# Patient Record
Sex: FEMALE | Race: WHITE | NOT HISPANIC OR LATINO | Employment: UNEMPLOYED | URBAN - METROPOLITAN AREA
[De-identification: names, ages, dates, MRNs, and addresses within clinical notes are randomized per-mention and may not be internally consistent; named-entity substitution may affect disease eponyms.]

---

## 2022-10-29 ENCOUNTER — HOSPITAL ENCOUNTER (INPATIENT)
Facility: MEDICAL CENTER | Age: 82
LOS: 4 days | DRG: 956 | End: 2022-11-02
Attending: INTERNAL MEDICINE | Admitting: STUDENT IN AN ORGANIZED HEALTH CARE EDUCATION/TRAINING PROGRAM
Payer: MEDICARE

## 2022-10-29 DIAGNOSIS — S32.82XA MULTIPLE CLOSED FRACTURES OF PELVIS WITHOUT DISRUPTION OF PELVIC RING, INITIAL ENCOUNTER (HCC): ICD-10-CM

## 2022-10-29 DIAGNOSIS — S72.002A CLOSED LEFT HIP FRACTURE, INITIAL ENCOUNTER (HCC): ICD-10-CM

## 2022-10-29 PROBLEM — R79.89 ELEVATED BRAIN NATRIURETIC PEPTIDE (BNP) LEVEL: Status: ACTIVE | Noted: 2022-10-29

## 2022-10-29 PROBLEM — M25.552 LEFT HIP PAIN: Status: ACTIVE | Noted: 2022-10-29

## 2022-10-29 PROBLEM — R79.89 ELEVATED TROPONIN: Status: ACTIVE | Noted: 2022-10-29

## 2022-10-29 PROBLEM — J98.11 ATELECTASIS: Status: ACTIVE | Noted: 2022-10-29

## 2022-10-29 PROBLEM — R17 ELEVATED BILIRUBIN: Status: ACTIVE | Noted: 2022-10-29

## 2022-10-29 LAB
BASOPHILS # BLD AUTO: 0.4 % (ref 0–1.8)
BASOPHILS # BLD: 0.03 K/UL (ref 0–0.12)
EKG IMPRESSION: NORMAL
EOSINOPHIL # BLD AUTO: 0.01 K/UL (ref 0–0.51)
EOSINOPHIL NFR BLD: 0.1 % (ref 0–6.9)
ERYTHROCYTE [DISTWIDTH] IN BLOOD BY AUTOMATED COUNT: 55.9 FL (ref 35.9–50)
HCT VFR BLD AUTO: 28.5 % (ref 37–47)
HGB BLD-MCNC: 9.1 G/DL (ref 12–16)
IMM GRANULOCYTES # BLD AUTO: 0.07 K/UL (ref 0–0.11)
IMM GRANULOCYTES NFR BLD AUTO: 0.8 % (ref 0–0.9)
LYMPHOCYTES # BLD AUTO: 0.41 K/UL (ref 1–4.8)
LYMPHOCYTES NFR BLD: 4.9 % (ref 22–41)
MCH RBC QN AUTO: 31.2 PG (ref 27–33)
MCHC RBC AUTO-ENTMCNC: 31.9 G/DL (ref 33.6–35)
MCV RBC AUTO: 97.6 FL (ref 81.4–97.8)
MONOCYTES # BLD AUTO: 0.67 K/UL (ref 0–0.85)
MONOCYTES NFR BLD AUTO: 8.1 % (ref 0–13.4)
NEUTROPHILS # BLD AUTO: 7.11 K/UL (ref 2–7.15)
NEUTROPHILS NFR BLD: 85.7 % (ref 44–72)
NRBC # BLD AUTO: 0 K/UL
NRBC BLD-RTO: 0 /100 WBC
PLATELET # BLD AUTO: 159 K/UL (ref 164–446)
PMV BLD AUTO: 10.5 FL (ref 9–12.9)
RBC # BLD AUTO: 2.92 M/UL (ref 4.2–5.4)
WBC # BLD AUTO: 8.3 K/UL (ref 4.8–10.8)

## 2022-10-29 PROCEDURE — 99223 1ST HOSP IP/OBS HIGH 75: CPT | Mod: AI,GC | Performed by: STUDENT IN AN ORGANIZED HEALTH CARE EDUCATION/TRAINING PROGRAM

## 2022-10-29 PROCEDURE — 93010 ELECTROCARDIOGRAM REPORT: CPT | Performed by: INTERNAL MEDICINE

## 2022-10-29 PROCEDURE — 80053 COMPREHEN METABOLIC PANEL: CPT

## 2022-10-29 PROCEDURE — 84484 ASSAY OF TROPONIN QUANT: CPT

## 2022-10-29 PROCEDURE — 83036 HEMOGLOBIN GLYCOSYLATED A1C: CPT

## 2022-10-29 PROCEDURE — 82550 ASSAY OF CK (CPK): CPT

## 2022-10-29 PROCEDURE — 770020 HCHG ROOM/CARE - TELE (206)

## 2022-10-29 PROCEDURE — 82248 BILIRUBIN DIRECT: CPT

## 2022-10-29 PROCEDURE — 93005 ELECTROCARDIOGRAM TRACING: CPT | Performed by: STUDENT IN AN ORGANIZED HEALTH CARE EDUCATION/TRAINING PROGRAM

## 2022-10-29 PROCEDURE — 36415 COLL VENOUS BLD VENIPUNCTURE: CPT

## 2022-10-29 PROCEDURE — 85025 COMPLETE CBC W/AUTO DIFF WBC: CPT

## 2022-10-29 RX ORDER — BISACODYL 10 MG
10 SUPPOSITORY, RECTAL RECTAL
Status: DISCONTINUED | OUTPATIENT
Start: 2022-10-29 | End: 2022-10-29

## 2022-10-29 RX ORDER — AMOXICILLIN 250 MG
2 CAPSULE ORAL 2 TIMES DAILY
Status: DISCONTINUED | OUTPATIENT
Start: 2022-10-29 | End: 2022-10-29

## 2022-10-29 RX ORDER — POLYETHYLENE GLYCOL 3350 17 G/17G
1 POWDER, FOR SOLUTION ORAL
Status: DISCONTINUED | OUTPATIENT
Start: 2022-10-29 | End: 2022-10-29

## 2022-10-29 RX ORDER — ENOXAPARIN SODIUM 100 MG/ML
40 INJECTION SUBCUTANEOUS DAILY
Status: DISCONTINUED | OUTPATIENT
Start: 2022-10-29 | End: 2022-10-29

## 2022-10-29 RX ORDER — ACETAMINOPHEN 325 MG/1
650 TABLET ORAL EVERY 6 HOURS PRN
Status: DISCONTINUED | OUTPATIENT
Start: 2022-10-29 | End: 2022-11-02 | Stop reason: HOSPADM

## 2022-10-29 ASSESSMENT — COGNITIVE AND FUNCTIONAL STATUS - GENERAL
MOVING FROM LYING ON BACK TO SITTING ON SIDE OF FLAT BED: A LITTLE
SUGGESTED CMS G CODE MODIFIER MOBILITY: CK
MOVING TO AND FROM BED TO CHAIR: A LITTLE
MOBILITY SCORE: 19
STANDING UP FROM CHAIR USING ARMS: A LITTLE
SUGGESTED CMS G CODE MODIFIER DAILY ACTIVITY: CH
DAILY ACTIVITIY SCORE: 24
WALKING IN HOSPITAL ROOM: A LITTLE
CLIMB 3 TO 5 STEPS WITH RAILING: A LITTLE

## 2022-10-29 ASSESSMENT — PATIENT HEALTH QUESTIONNAIRE - PHQ9
2. FEELING DOWN, DEPRESSED, IRRITABLE, OR HOPELESS: NOT AT ALL
SUM OF ALL RESPONSES TO PHQ9 QUESTIONS 1 AND 2: 0
1. LITTLE INTEREST OR PLEASURE IN DOING THINGS: NOT AT ALL

## 2022-10-29 ASSESSMENT — LIFESTYLE VARIABLES
HAVE PEOPLE ANNOYED YOU BY CRITICIZING YOUR DRINKING: NO
EVER HAD A DRINK FIRST THING IN THE MORNING TO STEADY YOUR NERVES TO GET RID OF A HANGOVER: NO
TOTAL SCORE: 0
CONSUMPTION TOTAL: NEGATIVE
HOW MANY TIMES IN THE PAST YEAR HAVE YOU HAD 5 OR MORE DRINKS IN A DAY: 0
TOTAL SCORE: 0
HAVE YOU EVER FELT YOU SHOULD CUT DOWN ON YOUR DRINKING: NO
ON A TYPICAL DAY WHEN YOU DRINK ALCOHOL HOW MANY DRINKS DO YOU HAVE: 0
AVERAGE NUMBER OF DAYS PER WEEK YOU HAVE A DRINK CONTAINING ALCOHOL: 0
DOES PATIENT WANT TO STOP DRINKING: NO
ALCOHOL_USE: NO
TOTAL SCORE: 0
EVER FELT BAD OR GUILTY ABOUT YOUR DRINKING: NO

## 2022-10-29 ASSESSMENT — PAIN DESCRIPTION - PAIN TYPE: TYPE: ACUTE PAIN

## 2022-10-30 ENCOUNTER — APPOINTMENT (OUTPATIENT)
Dept: RADIOLOGY | Facility: MEDICAL CENTER | Age: 82
DRG: 956 | End: 2022-10-30
Attending: STUDENT IN AN ORGANIZED HEALTH CARE EDUCATION/TRAINING PROGRAM
Payer: MEDICARE

## 2022-10-30 ENCOUNTER — ANESTHESIA EVENT (OUTPATIENT)
Dept: SURGERY | Facility: MEDICAL CENTER | Age: 82
DRG: 956 | End: 2022-10-30
Payer: MEDICARE

## 2022-10-30 ENCOUNTER — APPOINTMENT (OUTPATIENT)
Dept: CARDIOLOGY | Facility: MEDICAL CENTER | Age: 82
DRG: 956 | End: 2022-10-30
Attending: STUDENT IN AN ORGANIZED HEALTH CARE EDUCATION/TRAINING PROGRAM
Payer: MEDICARE

## 2022-10-30 ENCOUNTER — HOSPITAL ENCOUNTER (OUTPATIENT)
Dept: RADIOLOGY | Facility: MEDICAL CENTER | Age: 82
End: 2022-10-30
Attending: STUDENT IN AN ORGANIZED HEALTH CARE EDUCATION/TRAINING PROGRAM

## 2022-10-30 ENCOUNTER — ANESTHESIA (OUTPATIENT)
Dept: SURGERY | Facility: MEDICAL CENTER | Age: 82
DRG: 956 | End: 2022-10-30
Payer: MEDICARE

## 2022-10-30 PROBLEM — S72.002A CLOSED LEFT HIP FRACTURE, INITIAL ENCOUNTER (HCC): Status: ACTIVE | Noted: 2022-10-29

## 2022-10-30 PROBLEM — R09.02 HYPOXIA: Status: ACTIVE | Noted: 2022-10-30

## 2022-10-30 LAB
ALBUMIN SERPL BCP-MCNC: 3.5 G/DL (ref 3.2–4.9)
ALBUMIN/GLOB SERPL: 1.3 G/DL
ALP SERPL-CCNC: 93 U/L (ref 30–99)
ALT SERPL-CCNC: 17 U/L (ref 2–50)
ANION GAP SERPL CALC-SCNC: 13 MMOL/L (ref 7–16)
AST SERPL-CCNC: 26 U/L (ref 12–45)
BASE EXCESS BLDA CALC-SCNC: -2 MMOL/L (ref -4–3)
BILIRUB CONJ SERPL-MCNC: 0.2 MG/DL (ref 0.1–0.5)
BILIRUB CONJ SERPL-MCNC: 0.3 MG/DL (ref 0.1–0.5)
BILIRUB INDIRECT SERPL-MCNC: 0.6 MG/DL (ref 0–1)
BILIRUB SERPL-MCNC: 0.8 MG/DL (ref 0.1–1.5)
BODY TEMPERATURE: 37.3 CENTIGRADE
BUN SERPL-MCNC: 19 MG/DL (ref 8–22)
CALCIUM SERPL-MCNC: 8.6 MG/DL (ref 8.5–10.5)
CHLORIDE SERPL-SCNC: 107 MMOL/L (ref 96–112)
CK SERPL-CCNC: 334 U/L (ref 0–154)
CO2 SERPL-SCNC: 24 MMOL/L (ref 20–33)
CREAT SERPL-MCNC: 0.67 MG/DL (ref 0.5–1.4)
EST. AVERAGE GLUCOSE BLD GHB EST-MCNC: 103 MG/DL
EST. AVERAGE GLUCOSE BLD GHB EST-MCNC: 117 MG/DL
GFR SERPLBLD CREATININE-BSD FMLA CKD-EPI: 87 ML/MIN/1.73 M 2
GLOBULIN SER CALC-MCNC: 2.8 G/DL (ref 1.9–3.5)
GLUCOSE BLD STRIP.AUTO-MCNC: 103 MG/DL (ref 65–99)
GLUCOSE BLD STRIP.AUTO-MCNC: 168 MG/DL (ref 65–99)
GLUCOSE BLD STRIP.AUTO-MCNC: 58 MG/DL (ref 65–99)
GLUCOSE SERPL-MCNC: 101 MG/DL (ref 65–99)
HBA1C MFR BLD: 5.2 % (ref 4–5.6)
HBA1C MFR BLD: 5.7 % (ref 4–5.6)
HCO3 BLDA-SCNC: 22 MMOL/L (ref 17–25)
LV EJECT FRACT  99904: 65
LV EJECT FRACT MOD 2C 99903: 64.66
LV EJECT FRACT MOD 4C 99902: 68.97
LV EJECT FRACT MOD BP 99901: 65.47
PCO2 BLDA: 33.6 MMHG (ref 26–37)
PH BLDA: 7.43 [PH] (ref 7.4–7.5)
PO2 BLDA: 80.1 MMHG (ref 64–87)
POTASSIUM SERPL-SCNC: 3.3 MMOL/L (ref 3.6–5.5)
PROT SERPL-MCNC: 6.3 G/DL (ref 6–8.2)
SAO2 % BLDA: 94.4 % (ref 93–99)
SODIUM SERPL-SCNC: 144 MMOL/L (ref 135–145)
TROPONIN T SERPL-MCNC: 160 NG/L (ref 6–19)
TROPONIN T SERPL-MCNC: 170 NG/L (ref 6–19)

## 2022-10-30 PROCEDURE — 700105 HCHG RX REV CODE 258: Performed by: ANESTHESIOLOGY

## 2022-10-30 PROCEDURE — 160002 HCHG RECOVERY MINUTES (STAT): Performed by: STUDENT IN AN ORGANIZED HEALTH CARE EDUCATION/TRAINING PROGRAM

## 2022-10-30 PROCEDURE — 160009 HCHG ANES TIME/MIN: Performed by: STUDENT IN AN ORGANIZED HEALTH CARE EDUCATION/TRAINING PROGRAM

## 2022-10-30 PROCEDURE — 72192 CT PELVIS W/O DYE: CPT

## 2022-10-30 PROCEDURE — 160035 HCHG PACU - 1ST 60 MINS PHASE I: Performed by: STUDENT IN AN ORGANIZED HEALTH CARE EDUCATION/TRAINING PROGRAM

## 2022-10-30 PROCEDURE — 82803 BLOOD GASES ANY COMBINATION: CPT

## 2022-10-30 PROCEDURE — 83036 HEMOGLOBIN GLYCOSYLATED A1C: CPT

## 2022-10-30 PROCEDURE — 770020 HCHG ROOM/CARE - TELE (206)

## 2022-10-30 PROCEDURE — 99222 1ST HOSP IP/OBS MODERATE 55: CPT | Mod: 57 | Performed by: STUDENT IN AN ORGANIZED HEALTH CARE EDUCATION/TRAINING PROGRAM

## 2022-10-30 PROCEDURE — 36415 COLL VENOUS BLD VENIPUNCTURE: CPT

## 2022-10-30 PROCEDURE — 160036 HCHG PACU - EA ADDL 30 MINS PHASE I: Performed by: STUDENT IN AN ORGANIZED HEALTH CARE EDUCATION/TRAINING PROGRAM

## 2022-10-30 PROCEDURE — 84484 ASSAY OF TROPONIN QUANT: CPT

## 2022-10-30 PROCEDURE — 73721 MRI JNT OF LWR EXTRE W/O DYE: CPT | Mod: RT

## 2022-10-30 PROCEDURE — 0QH634Z INSERTION OF INTERNAL FIXATION DEVICE INTO RIGHT UPPER FEMUR, PERCUTANEOUS APPROACH: ICD-10-PCS | Performed by: STUDENT IN AN ORGANIZED HEALTH CARE EDUCATION/TRAINING PROGRAM

## 2022-10-30 PROCEDURE — 700101 HCHG RX REV CODE 250: Performed by: ANESTHESIOLOGY

## 2022-10-30 PROCEDURE — 73502 X-RAY EXAM HIP UNI 2-3 VIEWS: CPT | Mod: RT

## 2022-10-30 PROCEDURE — 160041 HCHG SURGERY MINUTES - EA ADDL 1 MIN LEVEL 4: Performed by: STUDENT IN AN ORGANIZED HEALTH CARE EDUCATION/TRAINING PROGRAM

## 2022-10-30 PROCEDURE — 27235 TREAT THIGH FRACTURE: CPT | Mod: RT | Performed by: STUDENT IN AN ORGANIZED HEALTH CARE EDUCATION/TRAINING PROGRAM

## 2022-10-30 PROCEDURE — 93306 TTE W/DOPPLER COMPLETE: CPT

## 2022-10-30 PROCEDURE — 700105 HCHG RX REV CODE 258: Performed by: HOSPITALIST

## 2022-10-30 PROCEDURE — C1713 ANCHOR/SCREW BN/BN,TIS/BN: HCPCS | Performed by: STUDENT IN AN ORGANIZED HEALTH CARE EDUCATION/TRAINING PROGRAM

## 2022-10-30 PROCEDURE — 72190 X-RAY EXAM OF PELVIS: CPT

## 2022-10-30 PROCEDURE — 99232 SBSQ HOSP IP/OBS MODERATE 35: CPT | Performed by: HOSPITALIST

## 2022-10-30 PROCEDURE — 160048 HCHG OR STATISTICAL LEVEL 1-5: Performed by: STUDENT IN AN ORGANIZED HEALTH CARE EDUCATION/TRAINING PROGRAM

## 2022-10-30 PROCEDURE — 160029 HCHG SURGERY MINUTES - 1ST 30 MINS LEVEL 4: Performed by: STUDENT IN AN ORGANIZED HEALTH CARE EDUCATION/TRAINING PROGRAM

## 2022-10-30 PROCEDURE — 99100 ANES PT EXTEME AGE<1 YR&>70: CPT | Performed by: ANESTHESIOLOGY

## 2022-10-30 PROCEDURE — 93306 TTE W/DOPPLER COMPLETE: CPT | Mod: 26 | Performed by: INTERNAL MEDICINE

## 2022-10-30 PROCEDURE — 700111 HCHG RX REV CODE 636 W/ 250 OVERRIDE (IP): Performed by: ANESTHESIOLOGY

## 2022-10-30 PROCEDURE — 01220 ANES CLSD PX UPPER 2/3 FEMUR: CPT | Performed by: ANESTHESIOLOGY

## 2022-10-30 PROCEDURE — 82248 BILIRUBIN DIRECT: CPT

## 2022-10-30 PROCEDURE — 82962 GLUCOSE BLOOD TEST: CPT | Mod: 91

## 2022-10-30 DEVICE — SCREW CANNULATED FULLY THREADED 7.3MM X 80MM (3TX3=9): Type: IMPLANTABLE DEVICE | Site: HIP | Status: FUNCTIONAL

## 2022-10-30 DEVICE — SCREW CANNULATED FULLY THREADED 7.3MM X 70MM (3TX3=9): Type: IMPLANTABLE DEVICE | Site: HIP | Status: FUNCTIONAL

## 2022-10-30 DEVICE — SCREW CANNULATED FULLY THREADED 7.3MM X 75MM (3TX3=9): Type: IMPLANTABLE DEVICE | Site: HIP | Status: FUNCTIONAL

## 2022-10-30 RX ORDER — ONDANSETRON 2 MG/ML
INJECTION INTRAMUSCULAR; INTRAVENOUS PRN
Status: DISCONTINUED | OUTPATIENT
Start: 2022-10-30 | End: 2022-10-30 | Stop reason: SURG

## 2022-10-30 RX ORDER — HALOPERIDOL 5 MG/ML
1 INJECTION INTRAMUSCULAR
Status: DISCONTINUED | OUTPATIENT
Start: 2022-10-30 | End: 2022-11-02 | Stop reason: HOSPADM

## 2022-10-30 RX ORDER — OXYCODONE HCL 5 MG/5 ML
5 SOLUTION, ORAL ORAL
Status: DISCONTINUED | OUTPATIENT
Start: 2022-10-30 | End: 2022-11-02 | Stop reason: HOSPADM

## 2022-10-30 RX ORDER — SODIUM CHLORIDE 9 MG/ML
INJECTION, SOLUTION INTRAVENOUS CONTINUOUS
Status: DISCONTINUED | OUTPATIENT
Start: 2022-10-30 | End: 2022-11-02 | Stop reason: HOSPADM

## 2022-10-30 RX ORDER — SODIUM CHLORIDE, SODIUM LACTATE, POTASSIUM CHLORIDE, CALCIUM CHLORIDE 600; 310; 30; 20 MG/100ML; MG/100ML; MG/100ML; MG/100ML
INJECTION, SOLUTION INTRAVENOUS CONTINUOUS
Status: DISCONTINUED | OUTPATIENT
Start: 2022-10-30 | End: 2022-10-30

## 2022-10-30 RX ORDER — DEXTROSE MONOHYDRATE 25 G/50ML
25 INJECTION, SOLUTION INTRAVENOUS ONCE
Status: DISCONTINUED | OUTPATIENT
Start: 2022-10-30 | End: 2022-10-30 | Stop reason: HOSPADM

## 2022-10-30 RX ORDER — MIDAZOLAM HYDROCHLORIDE 1 MG/ML
1 INJECTION INTRAMUSCULAR; INTRAVENOUS
Status: DISCONTINUED | OUTPATIENT
Start: 2022-10-30 | End: 2022-11-02 | Stop reason: HOSPADM

## 2022-10-30 RX ORDER — OXYCODONE HCL 5 MG/5 ML
10 SOLUTION, ORAL ORAL
Status: DISCONTINUED | OUTPATIENT
Start: 2022-10-30 | End: 2022-11-02 | Stop reason: HOSPADM

## 2022-10-30 RX ORDER — ONDANSETRON 2 MG/ML
4 INJECTION INTRAMUSCULAR; INTRAVENOUS
Status: DISCONTINUED | OUTPATIENT
Start: 2022-10-30 | End: 2022-11-02 | Stop reason: HOSPADM

## 2022-10-30 RX ORDER — ENOXAPARIN SODIUM 100 MG/ML
30 INJECTION SUBCUTANEOUS DAILY
Status: DISCONTINUED | OUTPATIENT
Start: 2022-10-31 | End: 2022-11-02 | Stop reason: HOSPADM

## 2022-10-30 RX ORDER — LABETALOL HYDROCHLORIDE 5 MG/ML
5 INJECTION, SOLUTION INTRAVENOUS
Status: DISCONTINUED | OUTPATIENT
Start: 2022-10-30 | End: 2022-11-02 | Stop reason: HOSPADM

## 2022-10-30 RX ORDER — DIPHENHYDRAMINE HYDROCHLORIDE 50 MG/ML
12.5 INJECTION INTRAMUSCULAR; INTRAVENOUS
Status: DISCONTINUED | OUTPATIENT
Start: 2022-10-30 | End: 2022-11-02 | Stop reason: HOSPADM

## 2022-10-30 RX ORDER — LIDOCAINE HYDROCHLORIDE 20 MG/ML
INJECTION, SOLUTION EPIDURAL; INFILTRATION; INTRACAUDAL; PERINEURAL PRN
Status: DISCONTINUED | OUTPATIENT
Start: 2022-10-30 | End: 2022-10-30 | Stop reason: SURG

## 2022-10-30 RX ORDER — HYDROMORPHONE HYDROCHLORIDE 1 MG/ML
0.1 INJECTION, SOLUTION INTRAMUSCULAR; INTRAVENOUS; SUBCUTANEOUS
Status: DISCONTINUED | OUTPATIENT
Start: 2022-10-30 | End: 2022-11-02 | Stop reason: HOSPADM

## 2022-10-30 RX ORDER — DEXAMETHASONE SODIUM PHOSPHATE 4 MG/ML
INJECTION, SOLUTION INTRA-ARTICULAR; INTRALESIONAL; INTRAMUSCULAR; INTRAVENOUS; SOFT TISSUE PRN
Status: DISCONTINUED | OUTPATIENT
Start: 2022-10-30 | End: 2022-10-30 | Stop reason: SURG

## 2022-10-30 RX ORDER — HYDROMORPHONE HYDROCHLORIDE 1 MG/ML
0.2 INJECTION, SOLUTION INTRAMUSCULAR; INTRAVENOUS; SUBCUTANEOUS
Status: DISCONTINUED | OUTPATIENT
Start: 2022-10-30 | End: 2022-11-02 | Stop reason: HOSPADM

## 2022-10-30 RX ORDER — CEFAZOLIN SODIUM 1 G/3ML
INJECTION, POWDER, FOR SOLUTION INTRAMUSCULAR; INTRAVENOUS PRN
Status: DISCONTINUED | OUTPATIENT
Start: 2022-10-30 | End: 2022-10-30 | Stop reason: SURG

## 2022-10-30 RX ORDER — DEXTROSE MONOHYDRATE 25 G/50ML
25 INJECTION, SOLUTION INTRAVENOUS
Status: DISCONTINUED | OUTPATIENT
Start: 2022-10-30 | End: 2022-11-02 | Stop reason: HOSPADM

## 2022-10-30 RX ORDER — SODIUM CHLORIDE, SODIUM LACTATE, POTASSIUM CHLORIDE, CALCIUM CHLORIDE 600; 310; 30; 20 MG/100ML; MG/100ML; MG/100ML; MG/100ML
INJECTION, SOLUTION INTRAVENOUS
Status: DISCONTINUED | OUTPATIENT
Start: 2022-10-30 | End: 2022-10-30 | Stop reason: SURG

## 2022-10-30 RX ORDER — HYDROMORPHONE HYDROCHLORIDE 1 MG/ML
0.4 INJECTION, SOLUTION INTRAMUSCULAR; INTRAVENOUS; SUBCUTANEOUS
Status: DISCONTINUED | OUTPATIENT
Start: 2022-10-30 | End: 2022-11-02 | Stop reason: HOSPADM

## 2022-10-30 RX ADMIN — DEXAMETHASONE SODIUM PHOSPHATE 8 MG: 4 INJECTION, SOLUTION INTRA-ARTICULAR; INTRALESIONAL; INTRAMUSCULAR; INTRAVENOUS; SOFT TISSUE at 14:31

## 2022-10-30 RX ADMIN — PROPOFOL 50 MG: 10 INJECTION, EMULSION INTRAVENOUS at 14:31

## 2022-10-30 RX ADMIN — CEFAZOLIN 2 G: 330 INJECTION, POWDER, FOR SOLUTION INTRAMUSCULAR; INTRAVENOUS at 14:31

## 2022-10-30 RX ADMIN — SODIUM CHLORIDE, POTASSIUM CHLORIDE, SODIUM LACTATE AND CALCIUM CHLORIDE: 600; 310; 30; 20 INJECTION, SOLUTION INTRAVENOUS at 14:31

## 2022-10-30 RX ADMIN — FENTANYL CITRATE 25 MCG: 50 INJECTION, SOLUTION INTRAMUSCULAR; INTRAVENOUS at 15:08

## 2022-10-30 RX ADMIN — LIDOCAINE HYDROCHLORIDE 100 MG: 20 INJECTION, SOLUTION EPIDURAL; INFILTRATION; INTRACAUDAL at 14:31

## 2022-10-30 RX ADMIN — SODIUM CHLORIDE, POTASSIUM CHLORIDE, SODIUM LACTATE AND CALCIUM CHLORIDE: 600; 310; 30; 20 INJECTION, SOLUTION INTRAVENOUS at 17:53

## 2022-10-30 RX ADMIN — ONDANSETRON 4 MG: 2 INJECTION INTRAMUSCULAR; INTRAVENOUS at 14:31

## 2022-10-30 RX ADMIN — SODIUM CHLORIDE: 9 INJECTION, SOLUTION INTRAVENOUS at 10:15

## 2022-10-30 RX ADMIN — FENTANYL CITRATE 25 MCG: 50 INJECTION, SOLUTION INTRAMUSCULAR; INTRAVENOUS at 14:53

## 2022-10-30 RX ADMIN — SODIUM CHLORIDE: 9 INJECTION, SOLUTION INTRAVENOUS at 21:02

## 2022-10-30 ASSESSMENT — PAIN DESCRIPTION - PAIN TYPE
TYPE: ACUTE PAIN

## 2022-10-30 ASSESSMENT — ENCOUNTER SYMPTOMS
CONSTIPATION: 0
FEVER: 0
WHEEZING: 0
VOMITING: 0
DIARRHEA: 0
NAUSEA: 0
COUGH: 0
HEADACHES: 0
SHORTNESS OF BREATH: 0
CHILLS: 0

## 2022-10-30 ASSESSMENT — PAIN SCALES - GENERAL: PAIN_LEVEL: 0

## 2022-10-30 NOTE — PROGRESS NOTES
Informed by the monitor room that this patient had 9 beats of Vtach at 2329. This RN was in the room with the patient at this time and the patient was asymptomatic. Notified MD.

## 2022-10-30 NOTE — CARE PLAN
The patient is Stable - Low risk of patient condition declining or worsening    Shift Goals  Clinical Goals: cardac workup, monitor for CP pain and SOB  Patient Goals: comfort, rest  Family Goals: comfort, rest    Progress made toward(s) clinical / shift goals:      Problem: Knowledge Deficit - Standard  Goal: Patient and family/care givers will demonstrate understanding of plan of care, disease process/condition, diagnostic tests and medications  Outcome: Progressing  Note: Patient educated on reasoning for admission, tests including x-ray CT and MRI of her pelvis. Pt verbalizes understanding and all questions were answered.     Problem: Fall Risk  Goal: Patient will remain free from falls  Outcome: Progressing  Note: Fall precautions are in place. Pt educated to call for assistance and verbalizes understanding/calls appropriately.       Patient is not progressing towards the following goals:

## 2022-10-30 NOTE — ANESTHESIA PREPROCEDURE EVALUATION
Case: [id number] Date/Time: 10/30/22 1200    Procedure: FIXATION, HIP, USING CANNULATED SCREW (Right: Hip) - [facility]    Anesthesia type: General    Location: [facility] OR  / SURGERY [facility]    Surgeons: Raciel López M.D.      81yoF with COPD s/p GLF     Presented with Elevated troponins (170-->160) & elevated BNP & EKG with mild ST depression. On tele had run of [employer] last night as well.     10/30/22 ECHO: EF 65% normal wall motion, normal diastolic function, Mild TR, RVSP 40mmHg     Pt does not receive medical care regularly at home    Relevant Problems   No relevant active problems       Physical Exam    Airway   Mallampati: II       Cardiovascular - normal exam     Dental    Pulmonary - normal exam     Abdominal - normal exam     Neurological - normal exam                 Anesthesia Plan    ASA 3   ASA physical status 3 criteria: COPD    Plan - general       Airway plan will be LMA          Induction: intravenous    Postoperative Plan: Postoperative administration of opioids is intended.    Pertinent diagnostic labs and testing reviewed    Informed Consent:    Anesthetic plan and risks discussed with patient.

## 2022-10-30 NOTE — CONSULTS
"10/30/2022    81-year-old female ground-level fall with right hip pain.  Right nondisplaced femoral neck fracture seen on CT and MRI.  Is potentially chronic in nature however difficult to discern as she is having right hip pain.  She also has some minimal displaced pelvic ring fractures.    No past medical history on file.    No past surgical history on file.    Medications  No current facility-administered medications on file prior to encounter.     No current outpatient medications on file prior to encounter.       Allergies  Patient has no allergy information on record.    ROS  . All other systems were reviewed and found to be negative    No family history on file.    Social History     Tobacco Use    Smoking status: Former     Types: Cigarettes    Smokeless tobacco: Never   Vaping Use    Vaping Use: Never used       Physical Exam  Vitals  /78   Pulse 95   Temp 36.1 °C (97 °F) (Temporal)   Resp 18   Ht 1.549 m (5' 1\")   Wt 47.7 kg (105 lb 2.6 oz)   SpO2 95%   General: Well Developed, Well Nourished, Age appropriate appearance  HEENT: Normocephalic, atraumatic  Psych: Normal mood and affect  Neck: Supple, nontender, no masses  Lungs: Breathing unlabored, No audible wheezing  Heart: Regular heart rate and rhythm  Abdomen: Soft, NT, ND  Neuro: Sensation grossly intact to BUE and BLE, moving all four extremities  Skin: Intact, no open wounds  Vascular: Right foot warm well perfused, Capillary refill <2 seconds  MSK: Right hip: Pain with logroll.  Pain with passive pelvis compression laterally.      Radiographs:  EC-ECHOCARDIOGRAM COMPLETE W/O CONT         MR-HIP-W/O RIGHT   Final Result      1.  Right femoral neck fracture with minimal associated edema      2.  This could indicate that the fracture is subacute      3.  Edema within the right adductor musculature consistent with a strain      CT-PELVIS W/O   Final Result         1.  Left pubic body fracture.   2.  Left superior pubic ramus/acetabular " fracture.   3.  Incomplete fracture of the left inferior pubic ramus.   4.  Minimally displaced left sacral fracture.   5.  Minimally displaced right inferior pubic ramus fracture.   6.  Right superior pubic ramus/pubic body fracture.   7.  Questionable, nondisplaced right femoral neck fracture.   8.  Severe degenerative changes of the right hip joint.   9.  Acute-subacute appearing L4 compression fracture.   10.  Decreased osseous mineralization.   11.  Gas in the anterior left lower quadrant subcutaneous fat. Correlate for recent injection versus infection.   12.  Large rectal stool ball.      DX-PELVIS-TRAUMA SERIES  3-   Final Result      1.  Osteopenia.   2.  Possible right femoral neck fracture.   3.  Age-indeterminate right superior and inferior pubic rami fractures.   4.  Age-indeterminate left inferior ramus fracture.      DX-HIP-UNILATERAL-W/O PELVIS-2/3 VIEWS RIGHT    (Results Pending)   DX-PORTABLE FLUORO > 1 HOUR    (Results Pending)       Laboratory Values  Recent Labs     10/29/22  2300   WBC 8.3   RBC 2.92*   HEMOGLOBIN 9.1*   HEMATOCRIT 28.5*   MCV 97.6   MCH 31.2   MCHC 31.9*   RDW 55.9*   PLATELETCT 159*   MPV 10.5     Recent Labs     10/29/22  2300   SODIUM 144   POTASSIUM 3.3*   CHLORIDE 107   CO2 24   GLUCOSE 101*   BUN 19   CPKTOTAL 334*             Impression: 81-year-old female ground-level fall with right nondisplaced femoral neck fracture as well as minimally displaced pelvic ring fractures.    Plan:We discussed the diagnosis and findings with the patient at length.  We reviewed possible non operative and operative interventions and the risks and benefits of each of these.  she had a chance to ask questions and all of these were answered to her satisfaction. The patient chose to proceed with surgical intervention. Risks and benefits of surgery were discussed which include but are not limited to bleeding, infection, neurovascular damage, malunion, nonunion, instability, limb length  discrepancy, DVT, PE, MI, Stroke and death. They understand these risks and wish to proceed.    Plan for or for percutaneous fixation right femoral neck fracture.  We discussed this may or may not be acute based on imaging however she is having pain so we discussed recommendations affixed to allow ambulation.    Raciel López MD  Orthopedic Trauma Surgery

## 2022-10-30 NOTE — PROGRESS NOTES
Right femoral neck fracture on MRI  Plan for percutaneous screw fixation today  NPO  BRET RLE    Full consult to follow      Raciel López MD  Orthopedic Trauma Surgery

## 2022-10-30 NOTE — ANESTHESIA POSTPROCEDURE EVALUATION
Patient: Bassam Sheehan    Procedure Summary     Date: 10/30/22 Room / Location: [place] [address] / SURGERY [facility]    Anesthesia Start: 1426 Anesthesia Stop: 1528    Procedure: FIXATION, HIP, USING CANNULATED SCREW (Right: Hip) Diagnosis: (RIGHT FEMORAL NECK FRACTURE)    Surgeons: Raciel López M.D. Responsible Provider: Teodoro Ellsworth M.D.    Anesthesia Type: general ASA Status: 3          Final Anesthesia Type: general  Last vitals  BP   Blood Pressure : (!) 148/68    Temp   36.7 °C (98 °F)    Pulse   76   Resp   16    SpO2   100 %      Anesthesia Post Evaluation    Patient location during evaluation: PACU  Patient participation: complete - patient participated  Level of consciousness: awake and alert  Pain score: 0    Airway patency: patent  Anesthetic complications: no  Cardiovascular status: adequate and hemodynamically stable  Respiratory status: acceptable  Hydration status: acceptable    PONV: none          No notable events documented.     Nurse Pain Score: 0 (NPRS)

## 2022-10-30 NOTE — OR NURSING
Patient sleepy.  Report called to Josselyn RAO on Tele states patient has not eatten for a long period.  Dr López updated.  FSBS 58.  Hospitalist paged to update per Dr López

## 2022-10-30 NOTE — ASSESSMENT & PLAN NOTE
(CT shows a possible R femoral neck fx even though her pain is on the L side. CT also showed several pubic rami fx/sacral fx)  -MRI = pending per Ortho recs (plan to formally consult again in the AM)

## 2022-10-30 NOTE — PROGRESS NOTES
Report received via telephone from outside facility. Patient brought up to room 735 via transport and assumed patient care. Pt A&O x 3, disorientated to situation, and on 2L NC, saturating 94 %. No signs of distress noted at this time. Notified admitting of patients arrival and paged the on-call direct admit hospitalist. Pt placed on tele box and monitors were notified of arrival. Pt updated on plan of care for the day and has call light within reach. Fall precautions are in place.

## 2022-10-30 NOTE — OR NURSING
Patient very sleepy. VSS. Resp spont and reg Denies pain or nausea.  Right hip dressing clean and dry.  Daughter Payton updated with patient status and plan.

## 2022-10-30 NOTE — THERAPY
Missed Therapy     Patient Name: Bassam Sheehan  Age:  81 y.o., Sex:  female  Medical Record #: [id number]  Today's Date: 10/30/2022    PT consult received. Per chart, plan for perc screw fixation RLE today. PT will evaluate post-op as able/appropriate.

## 2022-10-30 NOTE — ANESTHESIA PROCEDURE NOTES
Airway    Date/Time: 10/30/2022 2:44 PM  Performed by: Teodoro Ellsworth M.D.  Authorized by: Teodoro Ellsworth M.D.     Location:  OR  Urgency:  Elective  Indications for Airway Management:  Anesthesia      Spontaneous Ventilation: absent    Sedation Level:  Deep  Patient Position:  Sniffing  Mask Difficulty Assessment:  1 - vent by mask  Final Airway Type:  Supraglottic airway  Final Supraglottic Airway:  Standard LMA    SGA Size:  3  Number of Attempts at Approach:  1

## 2022-10-30 NOTE — ANESTHESIA TIME REPORT
Anesthesia Start and Stop Event Times     Date Time Event    10/30/2022 1420 Ready for Procedure     1426 Anesthesia Start     1528 Anesthesia Stop        Responsible Staff  10/30/22    Name Role Begin End    Teodoro Ellsworth M.D. Anesth 1426 1528        Overtime Reason:  no overtime (within assigned shift)    Comments:

## 2022-10-30 NOTE — PROGRESS NOTES
Right femoral neck fx on MRI  Plan for fixation today      Raciel López MD  Orthopedic Trauma Surgery

## 2022-10-30 NOTE — PROGRESS NOTES
4 Eyes Skin Assessment Completed by JALEN Card and CLAUS Chase.    Head WDL  Ears WDL  Nose WDL  Mouth WDL  Neck WDL  Breast/Chest WDL  Shoulder Blades WDL  Spine WDL  (R) Arm/Elbow/Hand Bruising  (L) Arm/Elbow/Hand Bruising  Abdomen WDL  Groin WDL  Scrotum/Coccyx/Buttocks WDL  (R) Leg Bruising, bruise to R shin from fall  (L) Leg Bruising, big bruise to L hip from fall  (R) Heel/Foot/Toe WDL  (L) Heel/Foot/Toe WDL          Devices In Places Tele Box, Blood Pressure Cuff, Pulse Ox, and Nasal Cannula      Interventions In Place Gray Ear Foams, Pillows, and Pressure Redistribution Mattress    Possible Skin Injury No    Pictures Uploaded Into Epic Yes  Wound Consult Placed N/A  RN Wound Prevention Protocol Ordered No

## 2022-10-30 NOTE — ASSESSMENT & PLAN NOTE
Trended down  Likely due to demand ischemia related to fall and fracture  Echo shows EF 65% with no wall motion abnormalities.

## 2022-10-30 NOTE — ASSESSMENT & PLAN NOTE
(Possibly secondary to muscle damage. etiology uncertain, but ACS is unlikely based on negative EKG and lack of CP. Pt does not have an elevated Cr that would suggest decreased clearance. CTA r/o PE).   -Trop T = 170

## 2022-10-30 NOTE — PROGRESS NOTES
Hospital Medicine Daily Progress Note    Date of Service  10/30/2022    Chief Complaint  Bassam Sheehan is a 81 y.o. female admitted 10/29/2022 with right femoral neck fracture    Hospital Course  No notes on file    Interval Problem Update  10/30: Taken by Ortho for femoral screw.  Echo unremarkable.  Feeling thirsty.  Started IV fluids.    I have discussed this patient's plan of care and discharge plan at IDT rounds today with Case Management, Nursing, Nursing leadership, and other members of the IDT team.    Code Status  Full Code    Disposition  Patient is not medically cleared for discharge.   Anticipate discharge to tbd.  I have placed the appropriate orders for post-discharge needs.    Review of Systems  Review of Systems   Constitutional:  Negative for chills and fever.        Thirsty   Respiratory:  Negative for cough, shortness of breath and wheezing.    Cardiovascular:  Negative for chest pain.   Gastrointestinal:  Negative for constipation, diarrhea, nausea and vomiting.   Genitourinary:  Negative for dysuria.   Musculoskeletal:  Positive for joint pain.   Neurological:  Negative for headaches.      Physical Exam  Temp:  [35.7 °C (96.3 °F)-37.7 °C (99.9 °F)] 35.7 °C (96.3 °F)  Pulse:  [89-97] 89  Resp:  [16-18] 16  BP: (125-184)/(73-80) 184/80  SpO2:  [91 %-95 %] 95 %    Physical Exam  Constitutional:       Appearance: She is well-developed.   HENT:      Head: Normocephalic.   Cardiovascular:      Rate and Rhythm: Normal rate.      Heart sounds:     No gallop.   Pulmonary:      Effort: No respiratory distress.      Breath sounds: No wheezing.   Abdominal:      General: There is no distension.      Tenderness: There is no abdominal tenderness.   Musculoskeletal:         General: Tenderness present.   Skin:     General: Skin is warm.   Neurological:      Mental Status: She is alert. Mental status is at baseline.       Fluids  No intake or output data in the 24 hours ending 10/30/22 1515    Laboratory  Recent  Labs     10/29/22  2300   WBC 8.3   RBC 2.92*   HEMOGLOBIN 9.1*   HEMATOCRIT 28.5*   MCV 97.6   MCH 31.2   MCHC 31.9*   RDW 55.9*   PLATELETCT 159*   MPV 10.5     Recent Labs     10/29/22  2300   SODIUM 144   POTASSIUM 3.3*   CHLORIDE 107   CO2 24   GLUCOSE 101*   BUN 19   CREATININE 0.67   CALCIUM 8.6                   Imaging  EC-ECHOCARDIOGRAM COMPLETE W/O CONT   Final Result      MR-HIP-W/O RIGHT   Final Result      1.  Right femoral neck fracture with minimal associated edema      2.  This could indicate that the fracture is subacute      3.  Edema within the right adductor musculature consistent with a strain      CT-PELVIS W/O   Final Result         1.  Left pubic body fracture.   2.  Left superior pubic ramus/acetabular fracture.   3.  Incomplete fracture of the left inferior pubic ramus.   4.  Minimally displaced left sacral fracture.   5.  Minimally displaced right inferior pubic ramus fracture.   6.  Right superior pubic ramus/pubic body fracture.   7.  Questionable, nondisplaced right femoral neck fracture.   8.  Severe degenerative changes of the right hip joint.   9.  Acute-subacute appearing L4 compression fracture.   10.  Decreased osseous mineralization.   11.  Gas in the anterior left lower quadrant subcutaneous fat. Correlate for recent injection versus infection.   12.  Large rectal stool ball.      DX-PELVIS-TRAUMA SERIES  3-   Final Result      1.  Osteopenia.   2.  Possible right femoral neck fracture.   3.  Age-indeterminate right superior and inferior pubic rami fractures.   4.  Age-indeterminate left inferior ramus fracture.      DX-HIP-UNILATERAL-W/O PELVIS-2/3 VIEWS RIGHT    (Results Pending)   DX-PORTABLE FLUORO > 1 HOUR    (Results Pending)        Assessment/Plan  * Elevated troponin- (present on admission)  Assessment & Plan  Trended down  Likely due to demand ischemia related to fall and fracture    Elevated BNP- (present on admission)  Assessment & Plan  Echo unremarkable    Elevated  bilirubin- (present on admission)  Assessment & Plan  Follow-up outpatient for further work-up    Closed left hip fracture, initial encounter (HCC)- (present on admission)  Assessment & Plan  Ortho consulted  Ortho took to the OR for femoral screw       VTE prophylaxis: enoxaparin ppx    I have performed a physical exam and reviewed and updated ROS and Plan today (10/30/2022). In review of yesterday's note (10/29/2022), there are no changes except as documented above.

## 2022-10-30 NOTE — ASSESSMENT & PLAN NOTE
(Normal Alk Phos, normal direct bili and lack of RUQ pain suggests Gilbert's Syndrome)  -further w/u can be pursued as an outpt

## 2022-10-30 NOTE — PROGRESS NOTES
[facility]OSPITAL[facility] TRIAGE OFFICER DIRECT ADMISSION REPORT  Transferring facility: [facility]  Transferring physician: Dr Harrison  Transferring facility/physician contact number:   Chief complaint: Hip pain  Pertinent history & patient course: 81-year-old female without known past medical history, presented to outside facility with complaints of left hip pain after ground-level fall.  X-ray is negative for fracture.  Patient noted to be hypoxic on room air, 84%, placed on 2 L.  Tachycardic with sinus tachycardia 120 on EKG without ST/T changes, but troponin is elevated at 1.26.  BNP 6000.  CTA of pulmonary artery negative for PE, showed emphysema.  CBC and CMP without significant abnormalities per Transferring physician.  Requested transfer for cardiology consult.  Pertinent imaging & lab results:   Code Status:  per transferring provider, I personally verified with the transferring provider patient's code status and the transferring provider has confirmed this with the patient.  Further work up or recommendations per triage officer prior to transfer:   Consultants called prior to transfer and pertinent input from consultants:   Patient accepted for transfer: Yes  Consultants to be called upon arrival: Consider cardiology consult  Admission status: Inpatient.   Floor requested: med  If ICU transfer, name of intensivist case discussed with and pertinent input from critical care:     Please inform the triage officer upon arrival of the patient to [facility] for assignment of a hospitalist to perform admission.     For any question or concerns regarding the care of this patient, please reach out to the assigned hospitalist.

## 2022-10-30 NOTE — H&P
History & Physical Note    Date of Admission: 10/29/2022  Admission Status: Inpatient  [facility] Team: [facility]  Attending: Case Thomas M.D.   Senior Resident: Dr. Montaño  Contact Number: [phone]    ID  81F with a h/o Emphysema who presented with L hip pain on 10/29/2022.    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    In detail, about 1 days before admission, while pt was baking cookies, she fell. She is unsure of how she fell but it did cause her to have an acute onset of L hip pain after the fall. She denies any head trauma/LOC/AVH/Aura/postictal state/light-headedness. She was ambulatory after the fall with a cane, but 1 day later she lost the ability to ambulate and her friend advised her to go the ED for further evaluation.     Review of Systems  -General:  denies fever/chills/sweats, denies recent weight loss  -Head/Neck: denies HA, denies neck stiffness   -Eyes:  denies double vision, denies blurry vision, denies eye pain  -ENT:  denies sore throat, denies tinnitus, denies nasal congestion  -Cardio:  denies chest pain, denies palpitations  -Resp:  denies SOB, denies cough, denies wheezing  -Abd:  denies abd pain, denies N/V/D  -Genitourinary:  denies dysuria, denies hematuria  -Neuro:  denies weakness, denies numbness/tingling in hands/feet  -Endo:  denies heat intolerance, denies cold intolerance  -Heme/ID:  denies frequent infections, denies frequent bleeding   -Skin: denies rashes, denies itching    Past Medical History  -see HPI  -denies MI/CVA  -denies childhood illness/chronic illnesses/congenital illnesses    Past Surgical History  -see HPI    Medications   -PRN ASA for pain       Allergies  -NKDA    Family History  -MI    Social History  -see HPI    Alcohol: denies   Recreational drugs (illegal and  prescription):  denies   Living situation:  pt is ambulatory and independent with ADLs at baseline  Primary Care Provider: reviewed No primary care provider on file.  Other (stressors, spirituality, exposures):  pt is not a Moravian, pt is not a vegetarian          Physical Exam    Vitals:  Temp:  [37.7 °C (99.9 °F)] 37.7 °C (99.9 °F)  Pulse:  [97] 97  Resp:  [18] 18  BP: (153)/(73) 153/73  SpO2:  [91 %] 91 %    -General: NAD, converses well  -Psych: pt is calm, neutral affect  -Head: no skull deformities, no scalp lacerations   -Eyes: EOMI, no mydriasis or miosis  -ENT: good oral hygeine, no nasal septal deviation  -Cardio: no murmurs or gallops  -Resp: lungs CTAB, symmetric expansion  -Abd: soft and nontender, negative Chung's sign, not distended, no guarding or rebound  -Neuro: strength 5/5 and symmetric in upper extremities bilaterally, decreased strength and ROM in the L hip. crude touch sensation intact in UE and LE bilaterally, CN II,III,IV,V,VI,VII,XI and XII intact. FNT test is intact.   -MSK/Skin: no lacerations. Small area of ecchymosis on the R shin. No tenderness to palpation in the UE bilaterally. L hip tenderness. No tenderness to palpation or vertebral stepoffs along the C/T/L spine. No chest wall tenderness or crepitus. No tenderness to palpation or steopoffs on the skull.        Data:  -WBC = 11  -Hgb = 10.5  -Cr = 0.7   -T bili = 1.6   -INR = 1.1   -D Dimer = positive  -Lactate = 1.6   -UA = negative   -TSH = wnl   -BNP = 6000  -Trop I = 1.4   -Trop T = 170  -CK = 334   -A1c = 5.7%    -COVID/Flu/RSV = negative   -Pelvis XR = possible R femoral neck fx and age indeterminate pubic rami fx   -CT Pelvis = possible R femoral neck fx, sacral fx, pubic rami fx  -CXR = Atelectasis  -CTA = r/o PE, but emphysema is present  -EKG = SR, minimal ST depressions in anterior leads, QTC~480    Assessment/Plan  Bassam Sheehan is a 81 year old female retired [employer]  with a h/o Emphysema (5ppd x30  years, but says she did not smoke all of the cigarettes that she lit, quit at age 51)    She presented with L hip pain on 10/29/2022.    Hospital Course:  She initially presented to an OSH, but she was transferred to [facility] due to an elevated Trop. Trop trend/PT/OT/MRI Pelvis/echo = pending.       * Elevated troponin- (present on admission)  Assessment & Plan  (Possibly secondary to muscle damage. etiology uncertain, but ACS is unlikely based on negative EKG and lack of CP. Pt does not have an elevated Cr that would suggest decreased clearance. CTA r/o PE).   -Trop T = 170    Left hip pain  Assessment & Plan  (CT shows a possible R femoral neck fx even though her pain is on the L side. CT also showed several pubic rami fx/sacral fx)  -MRI = pending per Ortho recs (plan to formally consult again in the AM)    Elevated BNP  Assessment & Plan  (etiology uncertain, but pt's last echo was unremarkable and she has no sx of HF)  -repeat Echo = pending    Atelectasis  Assessment & Plan  -IS    Elevated bilirubin  Assessment & Plan  (Normal Alk Phos, normal direct bili and lack of RUQ pain suggests Gilbert's Syndrome)  -further w/u can be pursued as an outpt        #FEN/GI  -NPO for possible LHC soon         -DVT ppx = SCDs only for possible LHC soon   -Code Status = Full Code     Dispo = pending trop trend and PT/OT     Derrell Montaño, PGY2  Internal Medicine Residency with [employer]     Plan has been discussed with Attending Physician.

## 2022-10-30 NOTE — OP REPORT
DATE OF OPERATION: 10/30/2022     PREOPERATIVE DIAGNOSIS: Right femoral neck fracture    POSTOPERATIVE DIAGNOSIS: Same    PROCEDURE PERFORMED: Right hip percutaneous screw fixation    SURGEON: Raciel López M.D.     ASSISTANT: None    ANESTHESIA: General    SPECIMEN: None    ESTIMATED BLOOD LOSS: Minimal mL    IMPLANTS: 7.3 mm cannulated screws x3      INDICATIONS: The patient is a 81 y.o. female who presented with above-mentioned injury.  I discussed the risks and benefits of the procedure which include but are not limited to risks of infection, wound healing complication, neurovascular injury, pain, malunion, non-union, malrotation, and the medical risks of anesthesia including MI, stroke, and death.  Alternatives to surgery were also discussed, including non-operative management, which I did not recommend.  The patient was in agreement with the plan to proceed, and the informed consent was signed and documented.  I met with the patient pre-operatively and marked the operative extremity with their agreement.  We proceeded to the operating room.     DESCRIPTION OF PROCEDURE:  Patient was seen in the preoperative holding area on the day of surgery. The operative site was marked with my initials.  she was taken to the operating room and placed supine on the operative table.  Anesthesia was induced.  The operative extremity was prepped and draped in the normal sterile fashion.  Operative pause was conducted and the correct patient, site, side, procedure, and surgeon's initials on extremity were identified.  Under fluoroscopic guidance 3 guidewires were placed in inverted triangle formation.  These were measured and fully threaded screws were then placed.  Care was taken to avoid penetration of the joint.  Final images were obtained including adequate reduction and fixation of femoral neck fracture with avoidance of joint penetration.  Was were closed staples for skin.  Sterile dressings were applied.  Patient was  awoken taken to PACU in stable condition.    POSTOPERATIVE PLAN: Weightbearing as tolerated right lower extremity weight bearing.  Mobilize with physical and occupational therapies.  DVT prophylaxis with SCDs and Lovenox until mobilizing independently and then can be switched to aspirin for 4 weeks.  The patient will follow up in clinic in 2 weeks to check wounds and remove sutures/staples.      ____________________________________   Raciel López M.D.   DD: 10/30/2022  3:28 PM

## 2022-10-30 NOTE — ASSESSMENT & PLAN NOTE
(etiology uncertain, but pt's last echo was unremarkable and she has no sx of HF)  -repeat Echo = pending

## 2022-10-31 ENCOUNTER — APPOINTMENT (OUTPATIENT)
Dept: RADIOLOGY | Facility: MEDICAL CENTER | Age: 82
DRG: 956 | End: 2022-10-31
Attending: HOSPITALIST
Payer: MEDICARE

## 2022-10-31 PROBLEM — S32.82XA MULTIPLE FRACTURES OF PELVIS (HCC): Status: ACTIVE | Noted: 2022-10-31

## 2022-10-31 PROBLEM — S72.001A CLOSED HIP FRACTURE, RIGHT, INITIAL ENCOUNTER (HCC): Status: ACTIVE | Noted: 2022-10-29

## 2022-10-31 LAB
ALBUMIN SERPL BCP-MCNC: 3.4 G/DL (ref 3.2–4.9)
BUN SERPL-MCNC: 16 MG/DL (ref 8–22)
CALCIUM SERPL-MCNC: 8.6 MG/DL (ref 8.5–10.5)
CHLORIDE SERPL-SCNC: 105 MMOL/L (ref 96–112)
CO2 SERPL-SCNC: 26 MMOL/L (ref 20–33)
CREAT SERPL-MCNC: 0.58 MG/DL (ref 0.5–1.4)
ERYTHROCYTE [DISTWIDTH] IN BLOOD BY AUTOMATED COUNT: 55 FL (ref 35.9–50)
GFR SERPLBLD CREATININE-BSD FMLA CKD-EPI: 90 ML/MIN/1.73 M 2
GLUCOSE SERPL-MCNC: 135 MG/DL (ref 65–99)
HCT VFR BLD AUTO: 30.5 % (ref 37–47)
HGB BLD-MCNC: 9.2 G/DL (ref 12–16)
MCH RBC QN AUTO: 30.1 PG (ref 27–33)
MCHC RBC AUTO-ENTMCNC: 30.2 G/DL (ref 33.6–35)
MCV RBC AUTO: 99.7 FL (ref 81.4–97.8)
PHOSPHATE SERPL-MCNC: 3.7 MG/DL (ref 2.5–4.5)
PLATELET # BLD AUTO: 165 K/UL (ref 164–446)
PMV BLD AUTO: 10.2 FL (ref 9–12.9)
POTASSIUM SERPL-SCNC: 3.5 MMOL/L (ref 3.6–5.5)
RBC # BLD AUTO: 3.06 M/UL (ref 4.2–5.4)
SODIUM SERPL-SCNC: 141 MMOL/L (ref 135–145)
WBC # BLD AUTO: 6.5 K/UL (ref 4.8–10.8)

## 2022-10-31 PROCEDURE — 80069 RENAL FUNCTION PANEL: CPT

## 2022-10-31 PROCEDURE — 77074 RADEX OSSEOUS SURVEY LMTD: CPT

## 2022-10-31 PROCEDURE — 700105 HCHG RX REV CODE 258: Performed by: HOSPITALIST

## 2022-10-31 PROCEDURE — A9270 NON-COVERED ITEM OR SERVICE: HCPCS | Performed by: HOSPITALIST

## 2022-10-31 PROCEDURE — 36415 COLL VENOUS BLD VENIPUNCTURE: CPT

## 2022-10-31 PROCEDURE — 85027 COMPLETE CBC AUTOMATED: CPT

## 2022-10-31 PROCEDURE — 700102 HCHG RX REV CODE 250 W/ 637 OVERRIDE(OP): Performed by: HOSPITALIST

## 2022-10-31 PROCEDURE — 99024 POSTOP FOLLOW-UP VISIT: CPT | Performed by: STUDENT IN AN ORGANIZED HEALTH CARE EDUCATION/TRAINING PROGRAM

## 2022-10-31 PROCEDURE — 97535 SELF CARE MNGMENT TRAINING: CPT

## 2022-10-31 PROCEDURE — 700111 HCHG RX REV CODE 636 W/ 250 OVERRIDE (IP): Performed by: HOSPITALIST

## 2022-10-31 PROCEDURE — 770020 HCHG ROOM/CARE - TELE (206)

## 2022-10-31 PROCEDURE — 97162 PT EVAL MOD COMPLEX 30 MIN: CPT

## 2022-10-31 PROCEDURE — 97166 OT EVAL MOD COMPLEX 45 MIN: CPT

## 2022-10-31 PROCEDURE — 99232 SBSQ HOSP IP/OBS MODERATE 35: CPT | Performed by: HOSPITALIST

## 2022-10-31 RX ORDER — POLYETHYLENE GLYCOL 3350 17 G/17G
1 POWDER, FOR SOLUTION ORAL DAILY
Status: DISCONTINUED | OUTPATIENT
Start: 2022-10-31 | End: 2022-11-02 | Stop reason: HOSPADM

## 2022-10-31 RX ADMIN — POLYETHYLENE GLYCOL 3350 1 PACKET: 17 POWDER, FOR SOLUTION ORAL at 12:55

## 2022-10-31 RX ADMIN — SODIUM CHLORIDE: 9 INJECTION, SOLUTION INTRAVENOUS at 11:46

## 2022-10-31 RX ADMIN — ENOXAPARIN SODIUM 30 MG: 30 INJECTION SUBCUTANEOUS at 17:02

## 2022-10-31 ASSESSMENT — ENCOUNTER SYMPTOMS
DIARRHEA: 0
SHORTNESS OF BREATH: 0
HEADACHES: 0
NAUSEA: 0
CONSTIPATION: 1
COUGH: 0
FEVER: 0
CHILLS: 0
VOMITING: 0

## 2022-10-31 ASSESSMENT — COGNITIVE AND FUNCTIONAL STATUS - GENERAL
MOVING FROM LYING ON BACK TO SITTING ON SIDE OF FLAT BED: A LOT
DRESSING REGULAR UPPER BODY CLOTHING: A LITTLE
TURNING FROM BACK TO SIDE WHILE IN FLAT BAD: A LITTLE
HELP NEEDED FOR BATHING: A LOT
PERSONAL GROOMING: A LITTLE
TOILETING: A LOT
DRESSING REGULAR LOWER BODY CLOTHING: A LOT
SUGGESTED CMS G CODE MODIFIER MOBILITY: CL
DAILY ACTIVITIY SCORE: 16
MOBILITY SCORE: 14
CLIMB 3 TO 5 STEPS WITH RAILING: A LOT
WALKING IN HOSPITAL ROOM: A LOT
SUGGESTED CMS G CODE MODIFIER DAILY ACTIVITY: CK
MOVING TO AND FROM BED TO CHAIR: A LITTLE
STANDING UP FROM CHAIR USING ARMS: A LOT

## 2022-10-31 ASSESSMENT — FIBROSIS 4 INDEX: FIB4 SCORE: 3.1

## 2022-10-31 ASSESSMENT — PAIN DESCRIPTION - PAIN TYPE
TYPE: ACUTE PAIN

## 2022-10-31 ASSESSMENT — GAIT ASSESSMENTS
DISTANCE (FEET): 5
DEVIATION: BRADYKINETIC;DECREASED BASE OF SUPPORT
GAIT LEVEL OF ASSIST: MAXIMAL ASSIST
ASSISTIVE DEVICE: FRONT WHEEL WALKER

## 2022-10-31 ASSESSMENT — ACTIVITIES OF DAILY LIVING (ADL): TOILETING: INDEPENDENT

## 2022-10-31 NOTE — CARE PLAN
The patient is Stable - Low risk of patient condition declining or worsening    Shift Goals  Clinical Goals: pain management, rest  Patient Goals: comfort, rest  Family Goals: comfort, rest    Progress made toward(s) clinical / shift goals:      Problem: Knowledge Deficit - Standard  Goal: Patient and family/care givers will demonstrate understanding of plan of care, disease process/condition, diagnostic tests and medications  Outcome: Progressing  Note: Patient and family were educated on plan of care for the shift, all questions were answered.     Problem: Fall Risk  Goal: Patient will remain free from falls  Outcome: Progressing  Note: Appropriate fall interventions are in place, pt has had no falls.     Problem: Skin Integrity  Goal: Skin integrity is maintained or improved  Outcome: Progressing  Note: Patients skin remains intact, surgical site dressing is clean dry and intact.       Patient is not progressing towards the following goals:

## 2022-10-31 NOTE — THERAPY
"Occupational Therapy   Initial Evaluation     Patient Name: Bassam Sheehan  Age:  81 y.o., Sex:  female  Medical Record #: [id number]  Today's Date: 10/31/2022    Precautions: Fall Risk, Weight Bearing As Tolerated Right Lower Extremity    Assessment    Patient is 81 y.o. female admitted following GLF, now s/p percutaneous screw fixation RLE. Pt presents to OT seated on BSC with RN in attendance. Pt became frustrated with interview questions and had difficulty providing clear explanations of her home environment or who she lives with. Pt had difficulty motor planning a stand pivot transfer safely, was unreceptive to cues for safe hand placement and easily frustrated with instructions from therapist. Unclear cognitive baseline, per RN report family states recent decline. If family is able to provide assist for ADLs and transfers DC home may be feasible however confirmation of degree of family assist available at home is critical to DC planning.     Plan    Recommend Occupational Therapy 3 times per week until therapy goals are met for the following treatments:  Adaptive Equipment, Self Care/Activities of Daily Living, Therapeutic Activities, and Therapeutic Exercises.    DC Equipment Recommendations: Unable to determine at this time  Discharge Recommendations: Recommend post-acute placement for additional occupational therapy services prior to discharge home (unless family is comfortable caring for pt in current condition, then recommend home with home health)     Subjective    \"We live funny...\" (pt had difficulty expressing complete/cohesive thoughts.)     Objective     10/31/22 1235   Prior Living Situation   Prior Services Unable To Determine At This Time   Lives with - Patient's Self Care Capacity Spouse;Adult Children   Comments very difficult to ascertain details of living situation, [place] stories/flights of stairs. will need to confirm home set up with family   Prior " "Level of ADL Function   Self Feeding Independent   Grooming / Hygiene Independent   Bathing Independent   Dressing Independent   Toileting Independent   Comments pt reports \"I am very independent!\" however unreliable historian   Prior Level of IADL Function   Comments family manages IADLs   Precautions   Precautions Fall Risk;Weight Bearing As Tolerated Right Lower Extremity   Pain 0 - 10 Group   Therapist Pain Assessment 0;Nurse Notified;Post Activity Pain Same as Prior to Activity   Cognition    Cognition / Consciousness X   Speech/ Communication Word Finding Impairment   Level of Consciousness Alert   Ability To Follow Commands Unable to Follow 1 Step Commands   Safety Awareness Impaired   New Learning Impaired   Attention Impaired   Sequencing Impaired   Initiation Impaired   Active ROM Upper Body   Active ROM Upper Body  WDL   Strength Upper Body   Upper Body Strength  WDL   Balance Assessment   Sitting Balance (Static) Fair   Sitting Balance (Dynamic) Fair -   Standing Balance (Static) Poor +   Weight Shift Sitting Fair   Weight Shift Standing Poor   Comments pt had difficulty following directions to take steps, required facilitation to transfer   Bed Mobility    Supine to Sit   (up with RN)   Sit to Supine Minimal Assist   Scooting Standby Assist   ADL Assessment   Upper Body Dressing Minimal Assist   Lower Body Dressing Maximal Assist   Toileting Moderate Assist   How much help from another person does the patient currently need...   Putting on and taking off regular lower body clothing? 2   Bathing (including washing, rinsing, and drying)? 2   Toileting, which includes using a toilet, bedpan, or urinal? 2   Putting on and taking off regular upper body clothing? 3   Taking care of personal grooming such as brushing teeth? 3   Eating meals? 4   6 Clicks Daily Activity Score 16   Functional Mobility   Sit to Stand Moderate Assist   Bed, Chair, Wheelchair Transfer Moderate Assist   Toilet Transfers Moderate " Assist   Mobility FWW, unsafe hand placement on FWW, not receptive to cues, required facilitation to complete stand pivot transfer   Activity Tolerance   Sitting in Chair 15min (bsc)   Sitting Edge of Bed 3min   Standing 5min   Short Term Goals   Short Term Goal # 1 pt will demo functional transfer with Ric   Short Term Goal # 2 pt will complete seated grooming with SPV   Short Term Goal # 3 pt will complete toileting self-care with SPV   Education Group   Education Provided Activities of Daily Living;Role of Occupational Therapist   Role of Occupational Therapist Patient Response Patient;Acceptance;Explanation;Verbal Demonstration;Reinforcement Needed   ADL Patient Response Patient;Acceptance;Explanation;Verbal Demonstration;No Learning Evidence   Problem List   Problem List Decreased Active Daily Living Skills;Decreased Homemaking Skills;Decreased Activity Tolerance;Decreased Functional Mobility;Safety Awareness Deficits / Cognition;Impaired Postural Control / Balance   Anticipated Discharge Equipment and Recommendations   DC Equipment Recommendations Unable to determine at this time   Discharge Recommendations Recommend post-acute placement for additional occupational therapy services prior to discharge home  (unless family is comfortable caring for pt in current condition, then recommend home with home health)

## 2022-10-31 NOTE — PROGRESS NOTES
Assumed care of PT A&O 2. Pt resting in bed with no signs of labored breathing. On 3L. Tele monitor in place, cardiac rhythm being monitored. Call light within reach, bed in lowest position, upper bed rails up. Pt was updated on plan of care for the day.

## 2022-10-31 NOTE — DISCHARGE PLANNING
Case Management Discharge Planning    Admission Date: 10/29/2022  GMLOS: 6.1  ALOS: 2    6-Clicks ADL Score: 24  6-Clicks Mobility Score: 19      Anticipated Discharge Dispo: Discharge Disposition: D/T to home under HHA care in anticipation of covered skilled care (06)        Medically Clear: No    Next Steps:  f/u with pt and medical team to discuss dc needs and barriers.    Barriers to Discharge: Medical clearance

## 2022-10-31 NOTE — ASSESSMENT & PLAN NOTE
Given these plus the decreased bony mineralization plus the femoral fracture, checking SPEP as well as skeletal survey as recommended by Ortho  UPEP and SPEP pending. Skeletal survey does not show lytic or sclerotic lesions;multiple compressions of thoracic and lumbar spine

## 2022-10-31 NOTE — PROGRESS NOTES
Hospital Medicine Daily Progress Note    Date of Service  10/31/2022    Chief Complaint  Bassam Sheehan is a 81 y.o. female admitted 10/29/2022 with right femoral neck fracture    Hospital Course  Bassam Sheehan is a 81 year old female retired [employer]  with a h/o Emphysema (5ppd x30 years, but says she did not smoke all of the cigarettes that she lit, quit at age 51)  She presented with L hip pain on 10/29/2022. She is unsure of how she fell but it did cause her to have an acute onset of L hip pain after the fall. She was ambulatory after the fall with a cane, but 1 day later she lost the ability to ambulate and her friend advised her to go the ED for further evaluation.   She was admitted.  While her pain was on the left, she was found to have a right femoral neck fracture on MRI.  Ortho took her for OR repair.  She had elevated troponin which is likely demand ischemia and trended down.  She also had multiple fractures in her pelvis.  Thus, SPEP and skeletal survey were ordered.  OT recommended SNF so she was referred.    Interval Problem Update  10/30: Taken by Ortho for femoral screw.  Echo unremarkable.  Feeling thirsty.  Started IV fluids.  10/31: Pending therapy. Checking SPEP, skeletal survey.  Constipation, scheduled MiraLAX.    I have discussed this patient's plan of care and discharge plan at IDT rounds today with Case Management, Nursing, Nursing leadership, and other members of the IDT team.    Code Status  Full Code    Disposition  Patient is not medically cleared for discharge.   Anticipate discharge to tbd.  I have placed the appropriate orders for post-discharge needs.    Review of Systems  Review of Systems   Constitutional:  Negative for chills and fever.   Respiratory:  Negative for cough and shortness of breath.    Cardiovascular:  Negative for chest pain.   Gastrointestinal:  Positive for constipation. Negative for diarrhea, nausea and vomiting.   Genitourinary:  Negative for dysuria.    Musculoskeletal:  Positive for joint pain.   Neurological:  Negative for headaches.      Physical Exam  Temp:  [35.7 °C (96.3 °F)-37.2 °C (99 °F)] 36.2 °C (97.2 °F)  Pulse:  [74-89] 75  Resp:  [14-20] 16  BP: (126-184)/(66-89) 135/75  SpO2:  [95 %-100 %] 100 %    Physical Exam  Constitutional:       Appearance: She is well-developed.   HENT:      Head: Normocephalic.      Right Ear: External ear normal.      Left Ear: External ear normal.   Cardiovascular:      Rate and Rhythm: Normal rate.      Heart sounds:     No gallop.   Pulmonary:      Effort: No respiratory distress.      Breath sounds: No wheezing.   Abdominal:      General: There is no distension.      Tenderness: There is no abdominal tenderness.   Musculoskeletal:         General: Tenderness present.   Skin:     General: Skin is warm.   Neurological:      Mental Status: She is alert. Mental status is at baseline.       Fluids    Intake/Output Summary (Last 24 hours) at 10/31/2022 1020  Last data filed at 10/31/2022 0600  Gross per 24 hour   Intake 800 ml   Output 300 ml   Net 500 ml       Laboratory  Recent Labs     10/29/22  2300 10/31/22  0307   WBC 8.3 6.5   RBC 2.92* 3.06*   HEMOGLOBIN 9.1* 9.2*   HEMATOCRIT 28.5* 30.5*   MCV 97.6 99.7*   MCH 31.2 30.1   MCHC 31.9* 30.2*   RDW 55.9* 55.0*   PLATELETCT 159* 165   MPV 10.5 10.2       Recent Labs     10/29/22  2300 10/31/22  0307   SODIUM 144 141   POTASSIUM 3.3* 3.5*   CHLORIDE 107 105   CO2 24 26   GLUCOSE 101* 135*   BUN 19 16   CREATININE 0.67 0.58   CALCIUM 8.6 8.6                     Imaging  DX-HIP-UNILATERAL-W/O PELVIS-2/3 VIEWS RIGHT   Final Result      55.2 seconds fluoroscopy time utilized for RIGHT hip surgery.      DX-PORTABLE FLUORO > 1 HOUR   Preliminary Result      Portable fluoroscopy utilized for 55 seconds.         INTERPRETING LOCATION: [address] [place] [address], [id number]      EC-ECHOCARDIOGRAM COMPLETE W/O CONT   Final Result      MR-HIP-W/O RIGHT   Final Result      1.  Right femoral  neck fracture with minimal associated edema      2.  This could indicate that the fracture is subacute      3.  Edema within the right adductor musculature consistent with a strain      CT-PELVIS W/O   Final Result         1.  Left pubic body fracture.   2.  Left superior pubic ramus/acetabular fracture.   3.  Incomplete fracture of the left inferior pubic ramus.   4.  Minimally displaced left sacral fracture.   5.  Minimally displaced right inferior pubic ramus fracture.   6.  Right superior pubic ramus/pubic body fracture.   7.  Questionable, nondisplaced right femoral neck fracture.   8.  Severe degenerative changes of the right hip joint.   9.  Acute-subacute appearing L4 compression fracture.   10.  Decreased osseous mineralization.   11.  Gas in the anterior left lower quadrant subcutaneous fat. Correlate for recent injection versus infection.   12.  Large rectal stool ball.      DX-PELVIS-TRAUMA SERIES  3-   Final Result      1.  Osteopenia.   2.  Possible right femoral neck fracture.   3.  Age-indeterminate right superior and inferior pubic rami fractures.   4.  Age-indeterminate left inferior ramus fracture.      DX-BONE SURVEY-METASTATIC LTD    (Results Pending)          Assessment/Plan  * Elevated troponin- (present on admission)  Assessment & Plan  Trended down  Likely due to demand ischemia related to fall and fracture    Multiple fractures of pelvis (HCC)- (present on admission)  Assessment & Plan  Given these plus the decreased bony mineralization plus the femoral fracture, checking SPEP as well as skeletal survey as recommended by Ortho    Elevated BNP- (present on admission)  Assessment & Plan  Echo unremarkable    Elevated bilirubin- (present on admission)  Assessment & Plan  Follow-up outpatient for further work-up    Closed hip fracture, right, initial encounter (HCC)- (present on admission)  Assessment & Plan  Ortho consulted  Ortho took to the OR for femoral screw       VTE prophylaxis:  enoxaparin ppx    I have performed a physical exam and reviewed and updated ROS and Plan today (10/31/2022). In review of yesterday's note (10/30/2022), there are no changes except as documented above.

## 2022-10-31 NOTE — PROGRESS NOTES
Report received and assumed patient care. Pt A&O x 3, disoriented to situation, and on 3L NC, saturating 96 %. No signs of distress noted at this time. Pt has tele box in place. Pt updated on plan of care for the day and has call light within reach. Fall precautions are in place.

## 2022-10-31 NOTE — PROGRESS NOTES
Postop day 1 status post cephalomedullary nail placement for left midshaft femur fracture  Resting comfortably this morning  Pathology report pending  Dressings clean dry and intact    Plan  Weight-bear as tolerated left lower extremity  PT/OT  DVT prophylaxis (Lovenox in house, home on aspirin)  Follow-up intraoperative pathology  Recommend SPEP/UPEP and skeletal survey  Follow-up in clinic in 2 weeks for suture removal        Raciel López MD  Orthopedic Trauma Surgery

## 2022-10-31 NOTE — THERAPY
"Physical Therapy   Initial Evaluation     Patient Name: Bassam Sheehan  Age:  81 y.o., Sex:  female  Medical Record #: [id number]  Today's Date: 10/31/2022     Precautions  Precautions: Fall Risk;Weight Bearing As Tolerated Right Lower Extremity  Comments: s/p R IMN    Assessment  Patient is 81 y.o. female Postop day 1 status post cephalomedullary nail placement for left midshaft femur fracture.  Patient presents with impaired cognition and insight into deficits. She is a poor historian but reports she was using a FWW after her fall.  She lives with her daughter and .  Patient needs ModA for STS and MaxA to transfer 2/2 impaired command following. Patient will need placement for further therapy prior to DC home. Recommend cognitive assessment, as her grandson reports her confusion has been worsening.       Plan    Recommend Physical Therapy 4 times per week until therapy goals are met for the following treatments:  Bed Mobility, Equipment, Gait Training, Neuro Re-Education / Balance, Stair Training, Therapeutic Activities, and Therapeutic Exercises    DC Equipment Recommendations: Unable to determine at this time  Discharge Recommendations: Recommend post-acute placement for additional physical therapy services prior to discharge home       Subjective    \"I'm an independent woman\"     Objective     10/31/22 1200   Precautions   Precautions Weight Bearing As Tolerated Right Lower Extremity;Fall Risk   Comments s/p R IMN   Pain 0 - 10 Group   Location Hip   Therapist Pain Assessment 0;Post Activity Pain Same as Prior to Activity;Nurse Notified   Prior Living Situation   Prior Services Unable To Determine At This Time   Housing / Facility 3 Story House   Equipment Owned Front-Wheel Walker   Lives with - Patient's Self Care Capacity Spouse;Adult Children   Comments Patient is a poor historian. She reports living at a [facility] in [place] and then reports she has moved in with her daughter. She says she has lots of " stairs and is not able to describe PLOF   Prior Level of Functional Mobility   Bed Mobility Unable To Determine At This Time   Transfer Status Unable To Determine At This Time   Ambulation Unable To Determine At This Time   Comments patient is a poor historian, will need to speak to family to obtain PLOF   History of Falls   History of Falls Yes   Date of Last Fall   (reason for admit)   Cognition    Cognition / Consciousness X   Speech/ Communication Word Finding Impairment   Orientation Level Not Oriented to Year;Not Oriented to Month;Not Oriented to Place   Level of Consciousness Alert   Ability To Follow Commands Unable to Follow 1 Step Commands   Safety Awareness Impaired   New Learning Impaired   Attention Impaired   Sequencing Impaired   Initiation Impaired   Strength Lower Body   Lower Body Strength  X   Gross Strength Generalized Weakness, Equal Bilaterally   Strength Upper Body   Upper Body Strength  WDL   Balance Assessment   Sitting Balance (Static) Fair   Sitting Balance (Dynamic) Fair -   Standing Balance (Static) Poor +   Standing Balance (Dynamic) Poor +   Weight Shift Sitting Fair   Weight Shift Standing Poor   Comments w/FWW   Gait Analysis   Gait Level Of Assist Maximal Assist   Assistive Device Front Wheel Walker   Distance (Feet) 5   # of Times Distance was Traveled 1   Deviation Bradykinetic;Decreased Base Of Support   Weight Bearing Status WBAT R LE   Comments impaired following commands and needs tactile cueing to progress stepping. Leans on FWW with B forearms despite cueing   Bed Mobility    Sit to Supine Minimal Assist   Scooting Standby Assist   Functional Mobility   Sit to Stand Moderate Assist   Bed, Chair, Wheelchair Transfer Moderate Assist   Toilet Transfers Moderate Assist   How much difficulty does the patient currently have...   Turning over in bed (including adjusting bedclothes, sheets and blankets)? 3   Sitting down on and standing up from a chair with arms (e.g., wheelchair,  bedside commode, etc.) 2   Moving from lying on back to sitting on the side of the bed? 3   How much help from another person does the patient currently need...   Moving to and from a bed to a chair (including a wheelchair)? 2   Need to walk in a hospital room? 2   Climbing 3-5 steps with a railing? 2   6 clicks Mobility Score 14   Activity Tolerance   Sitting Edge of Bed 3 min   Standing 3 min   Patient / Family Goals    Patient / Family Goal #1 to go home   Short Term Goals    Short Term Goal # 1 in 6 visits patient will demo all functional transfers with Joel and FWW for safe DC   Short Term Goal # 2 in 6 visits patient will ambulate 100' with Joel and FWW for safe DC   Short Term Goal # 3 in 6 visits patient will demo all bed mobility indep for safe DC   Education Group   Education Provided Role of Physical Therapist;Use of Assistive Device   Role of Physical Therapist Patient Response Patient;Acceptance;Explanation;Demonstration;Verbal Demonstration   Use of Assistive Device Patient Response Patient;Acceptance;Explanation;Demonstration;Verbal Demonstration;Reinforcement Needed   Problem List    Problems Impaired Bed Mobility;Impaired Transfers;Impaired Ambulation;Functional Strength Deficit;Impaired Balance;Decreased Activity Tolerance;Safety Awareness Deficits / Cognition;Motor Planning / Sequencing   Anticipated Discharge Equipment and Recommendations   DC Equipment Recommendations Unable to determine at this time   Discharge Recommendations Recommend post-acute placement for additional physical therapy services prior to discharge home     Ally Morales, PT, DPT, GCS

## 2022-10-31 NOTE — OR NURSING
Patient able to tolerate soda.  A+OX4. Denies pain or nausea. Daughter updated with patient status.  Josselyn RAO also updated with patient status

## 2022-10-31 NOTE — HOSPITAL COURSE
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 She is unsure of how she fell but it did cause her to have an acute onset of L hip pain after the fall. She was ambulatory after the fall with a cane, but 1 day later she lost the ability to ambulate and her friend advised her to go the ED for further evaluation.   She was admitted.  While her pain was on the left, she was found to have a right femoral neck fracture on MRI.  Ortho took her for OR repair.  She had elevated troponin which is likely demand ischemia and trended down.  She also had multiple fractures in her pelvis.  Thus, SPEP and skeletal survey were ordered.  OT recommended SNF so she was referred.

## 2022-10-31 NOTE — CARE PLAN
The patient is Stable - Low risk of patient condition declining or worsening    Shift Goals  Clinical Goals: pain management, rest  Patient Goals: comfort, rest  Family Goals: comfort, rest    Progress made toward(s) clinical / shift goals:  PT walked, and remained fall free.    Patient is not progressing towards the following goals:

## 2022-11-01 ENCOUNTER — APPOINTMENT (OUTPATIENT)
Dept: RADIOLOGY | Facility: MEDICAL CENTER | Age: 82
DRG: 956 | End: 2022-11-01
Payer: MEDICARE

## 2022-11-01 ENCOUNTER — APPOINTMENT (OUTPATIENT)
Dept: RADIOLOGY | Facility: MEDICAL CENTER | Age: 82
DRG: 956 | End: 2022-11-01
Attending: STUDENT IN AN ORGANIZED HEALTH CARE EDUCATION/TRAINING PROGRAM
Payer: MEDICARE

## 2022-11-01 LAB
ALBUMIN SERPL BCP-MCNC: 2.9 G/DL (ref 3.2–4.9)
BASOPHILS # BLD AUTO: 0.2 % (ref 0–1.8)
BASOPHILS # BLD: 0.01 K/UL (ref 0–0.12)
BUN SERPL-MCNC: 13 MG/DL (ref 8–22)
CALCIUM SERPL-MCNC: 8 MG/DL (ref 8.5–10.5)
CHLORIDE SERPL-SCNC: 109 MMOL/L (ref 96–112)
CO2 SERPL-SCNC: 24 MMOL/L (ref 20–33)
CREAT SERPL-MCNC: 0.51 MG/DL (ref 0.5–1.4)
EOSINOPHIL # BLD AUTO: 0.04 K/UL (ref 0–0.51)
EOSINOPHIL NFR BLD: 0.7 % (ref 0–6.9)
ERYTHROCYTE [DISTWIDTH] IN BLOOD BY AUTOMATED COUNT: 54.4 FL (ref 35.9–50)
ERYTHROCYTE [DISTWIDTH] IN BLOOD BY AUTOMATED COUNT: 54.6 FL (ref 35.9–50)
GFR SERPLBLD CREATININE-BSD FMLA CKD-EPI: 93 ML/MIN/1.73 M 2
GLUCOSE BLD STRIP.AUTO-MCNC: 118 MG/DL (ref 65–99)
GLUCOSE BLD STRIP.AUTO-MCNC: 92 MG/DL (ref 65–99)
GLUCOSE SERPL-MCNC: 111 MG/DL (ref 65–99)
HCT VFR BLD AUTO: 24.8 % (ref 37–47)
HCT VFR BLD AUTO: 26.4 % (ref 37–47)
HCT VFR BLD AUTO: 27.3 % (ref 37–47)
HGB BLD-MCNC: 7.6 G/DL (ref 12–16)
HGB BLD-MCNC: 7.9 G/DL (ref 12–16)
HGB BLD-MCNC: 8.5 G/DL (ref 12–16)
IMM GRANULOCYTES # BLD AUTO: 0.05 K/UL (ref 0–0.11)
IMM GRANULOCYTES NFR BLD AUTO: 0.9 % (ref 0–0.9)
LYMPHOCYTES # BLD AUTO: 0.62 K/UL (ref 1–4.8)
LYMPHOCYTES NFR BLD: 11.5 % (ref 22–41)
MCH RBC QN AUTO: 30.5 PG (ref 27–33)
MCH RBC QN AUTO: 30.7 PG (ref 27–33)
MCHC RBC AUTO-ENTMCNC: 30.6 G/DL (ref 33.6–35)
MCHC RBC AUTO-ENTMCNC: 31.1 G/DL (ref 33.6–35)
MCV RBC AUTO: 98.6 FL (ref 81.4–97.8)
MCV RBC AUTO: 99.6 FL (ref 81.4–97.8)
MONOCYTES # BLD AUTO: 0.72 K/UL (ref 0–0.85)
MONOCYTES NFR BLD AUTO: 13.4 % (ref 0–13.4)
NEUTROPHILS # BLD AUTO: 3.93 K/UL (ref 2–7.15)
NEUTROPHILS NFR BLD: 73.3 % (ref 44–72)
NRBC # BLD AUTO: 0 K/UL
NRBC BLD-RTO: 0 /100 WBC
PHOSPHATE SERPL-MCNC: 2.2 MG/DL (ref 2.5–4.5)
PLATELET # BLD AUTO: 150 K/UL (ref 164–446)
PLATELET # BLD AUTO: 160 K/UL (ref 164–446)
PMV BLD AUTO: 10 FL (ref 9–12.9)
PMV BLD AUTO: 9.7 FL (ref 9–12.9)
POTASSIUM SERPL-SCNC: 3.5 MMOL/L (ref 3.6–5.5)
RBC # BLD AUTO: 2.49 M/UL (ref 4.2–5.4)
RBC # BLD AUTO: 2.77 M/UL (ref 4.2–5.4)
SODIUM SERPL-SCNC: 142 MMOL/L (ref 135–145)
WBC # BLD AUTO: 5.4 K/UL (ref 4.8–10.8)
WBC # BLD AUTO: 5.7 K/UL (ref 4.8–10.8)

## 2022-11-01 PROCEDURE — 85018 HEMOGLOBIN: CPT

## 2022-11-01 PROCEDURE — 99232 SBSQ HOSP IP/OBS MODERATE 35: CPT | Mod: GC | Performed by: STUDENT IN AN ORGANIZED HEALTH CARE EDUCATION/TRAINING PROGRAM

## 2022-11-01 PROCEDURE — 36415 COLL VENOUS BLD VENIPUNCTURE: CPT

## 2022-11-01 PROCEDURE — 84165 PROTEIN E-PHORESIS SERUM: CPT

## 2022-11-01 PROCEDURE — 84155 ASSAY OF PROTEIN SERUM: CPT

## 2022-11-01 PROCEDURE — 97530 THERAPEUTIC ACTIVITIES: CPT

## 2022-11-01 PROCEDURE — 84166 PROTEIN E-PHORESIS/URINE/CSF: CPT

## 2022-11-01 PROCEDURE — 97116 GAIT TRAINING THERAPY: CPT

## 2022-11-01 PROCEDURE — 700105 HCHG RX REV CODE 258: Performed by: HOSPITALIST

## 2022-11-01 PROCEDURE — 700111 HCHG RX REV CODE 636 W/ 250 OVERRIDE (IP): Performed by: HOSPITALIST

## 2022-11-01 PROCEDURE — 85014 HEMATOCRIT: CPT

## 2022-11-01 PROCEDURE — 84156 ASSAY OF PROTEIN URINE: CPT

## 2022-11-01 PROCEDURE — 99024 POSTOP FOLLOW-UP VISIT: CPT | Performed by: STUDENT IN AN ORGANIZED HEALTH CARE EDUCATION/TRAINING PROGRAM

## 2022-11-01 PROCEDURE — 85025 COMPLETE CBC W/AUTO DIFF WBC: CPT

## 2022-11-01 PROCEDURE — 770020 HCHG ROOM/CARE - TELE (206)

## 2022-11-01 PROCEDURE — 70450 CT HEAD/BRAIN W/O DYE: CPT

## 2022-11-01 PROCEDURE — 86335 IMMUNFIX E-PHORSIS/URINE/CSF: CPT

## 2022-11-01 PROCEDURE — 82962 GLUCOSE BLOOD TEST: CPT | Mod: 91

## 2022-11-01 PROCEDURE — 80069 RENAL FUNCTION PANEL: CPT

## 2022-11-01 PROCEDURE — 71045 X-RAY EXAM CHEST 1 VIEW: CPT

## 2022-11-01 PROCEDURE — A9270 NON-COVERED ITEM OR SERVICE: HCPCS | Performed by: HOSPITALIST

## 2022-11-01 PROCEDURE — 700102 HCHG RX REV CODE 250 W/ 637 OVERRIDE(OP): Performed by: HOSPITALIST

## 2022-11-01 PROCEDURE — 85027 COMPLETE CBC AUTOMATED: CPT

## 2022-11-01 RX ADMIN — SODIUM CHLORIDE: 9 INJECTION, SOLUTION INTRAVENOUS at 14:30

## 2022-11-01 RX ADMIN — SODIUM CHLORIDE: 9 INJECTION, SOLUTION INTRAVENOUS at 02:09

## 2022-11-01 RX ADMIN — ENOXAPARIN SODIUM 30 MG: 30 INJECTION SUBCUTANEOUS at 16:45

## 2022-11-01 RX ADMIN — POLYETHYLENE GLYCOL 3350 1 PACKET: 17 POWDER, FOR SOLUTION ORAL at 06:03

## 2022-11-01 ASSESSMENT — COGNITIVE AND FUNCTIONAL STATUS - GENERAL
MOVING TO AND FROM BED TO CHAIR: A LOT
CLIMB 3 TO 5 STEPS WITH RAILING: TOTAL
MOVING FROM LYING ON BACK TO SITTING ON SIDE OF FLAT BED: A LOT
MOBILITY SCORE: 11
SUGGESTED CMS G CODE MODIFIER MOBILITY: CL
STANDING UP FROM CHAIR USING ARMS: A LOT
WALKING IN HOSPITAL ROOM: A LOT
TURNING FROM BACK TO SIDE WHILE IN FLAT BAD: A LOT

## 2022-11-01 ASSESSMENT — ENCOUNTER SYMPTOMS
BACK PAIN: 1
CHILLS: 0
DIARRHEA: 0
COUGH: 0
NAUSEA: 0
FEVER: 0
FALLS: 1
HEADACHES: 0
VOMITING: 0
SHORTNESS OF BREATH: 0
CONSTIPATION: 1

## 2022-11-01 ASSESSMENT — GAIT ASSESSMENTS: GAIT LEVEL OF ASSIST: UNABLE TO PARTICIPATE

## 2022-11-01 ASSESSMENT — PATIENT HEALTH QUESTIONNAIRE - PHQ9
1. LITTLE INTEREST OR PLEASURE IN DOING THINGS: NOT AT ALL
2. FEELING DOWN, DEPRESSED, IRRITABLE, OR HOPELESS: NOT AT ALL
SUM OF ALL RESPONSES TO PHQ9 QUESTIONS 1 AND 2: 0

## 2022-11-01 NOTE — PROGRESS NOTES
Pt daughter stated she is worried that her mother may have a stroke due to increase confusion. Neuro checks completed. MD notified.

## 2022-11-01 NOTE — DISCHARGE PLANNING
RNCM PC to pt daughter to discuss discharge wishes. Pt lives in [place] and daughter cannot meet pt needs so daughter agrees with PT/OT recs of placement for skilled rehab. Daughter choiced for [facility].

## 2022-11-01 NOTE — PROGRESS NOTES
Report received from Chandler Villavicencio. Assumed pt care. Pt sitting up in bed. Pt A&O x 2. Fall precautions in place, call light and belongings within reach, bed in lowest position. No signs of distress.

## 2022-11-01 NOTE — PROGRESS NOTES
Assumed care of patient. Pt is A+O x2. No chest pain or SOB. No active bleeding noted. Informed of safety and call system. rhythm is sinus rhythm. Pt on 2lpm via CHEKO diehl 92%.

## 2022-11-01 NOTE — PROGRESS NOTES
[facility] Medicine Daily Progress Note    Date of Service  11/1/2022    Chief Complaint  Bassam Sheehan is a 81 y.o. female admitted 10/29/2022 with right femoral neck fracture    Hospital Course  Bassam Sheehan is a 81 year old female retired [employer]  with a h/o Emphysema (5ppd x30 years, but says she did not smoke all of the cigarettes that she lit, quit at age 51)  She presented with L hip pain on 10/29/2022. She is unsure of how she fell but it did cause her to have an acute onset of L hip pain after the fall. She was ambulatory after the fall with a cane, but 1 day later she lost the ability to ambulate and her friend advised her to go the ED for further evaluation.   She was admitted.  While her pain was on the left, she was found to have a right femoral neck fracture on MRI.  Ortho took her for OR repair.  She had elevated troponin which is likely demand ischemia and trended down.  She also had multiple fractures in her pelvis.  Thus, SPEP and skeletal survey were ordered.  OT recommended SNF so she was referred.    Interval Problem Update  10/30: Taken by Ortho for femoral screw.  Echo unremarkable.  Feeling thirsty.  Started IV fluids.  10/31: Pending therapy. Checking SPEP, skeletal survey.  Constipation, scheduled MiraLAX.  11/1: Plan of care discussed with the patient's daughter and granddaughter.  They report the patient has seen little more confused recently and they report that she had hit her head previous to the hospitalization.  We will order a CT of the head.  Patient had a echocardiogram and troponins trended on initial hospitalization days.  Since the echocardiograms not showing any wall motion abnormalities we will not do any further work-up at this time.  Patient's SPEP and UPEP are pending.  Skeletal survey did not show any lytic or sclerotic lesions however it did show thoracic and lumbar multiple compressions.    I have discussed this patient's plan of care and discharge plan at IDT  rounds today with Case Management, Nursing, Nursing leadership, and other members of the IDT team.    Code Status  Full Code    Disposition  Patient is not medically cleared for discharge.   Anticipate discharge to d.  I have placed the appropriate orders for post-discharge needs.    Review of Systems  Review of Systems   Constitutional:  Negative for chills and fever.   Respiratory:  Negative for cough and shortness of breath.    Cardiovascular:  Negative for chest pain.   Gastrointestinal:  Positive for constipation. Negative for diarrhea, nausea and vomiting.   Genitourinary:  Negative for dysuria.   Musculoskeletal:  Positive for back pain, falls and joint pain.   Neurological:  Negative for headaches.   All other systems reviewed and are negative.     Physical Exam  Temp:  [36.5 °C (97.7 °F)-37.1 °C (98.8 °F)] 36.8 °C (98.2 °F)  Pulse:  [81-92] 92  Resp:  [15-16] 15  BP: (130-139)/(67-78) 138/70  SpO2:  [83 %-97 %] 96 %    Physical Exam  Vitals and nursing note reviewed.   Constitutional:       Appearance: She is well-developed. She is not ill-appearing.   HENT:      Head: Normocephalic.      Right Ear: External ear normal.      Left Ear: External ear normal.   Eyes:      General: No scleral icterus.        Right eye: No discharge.         Left eye: No discharge.   Cardiovascular:      Rate and Rhythm: Normal rate and regular rhythm.      Heart sounds:     No gallop.   Pulmonary:      Effort: No respiratory distress.      Breath sounds: No wheezing.   Abdominal:      General: There is no distension.      Tenderness: There is no abdominal tenderness.   Musculoskeletal:         General: Tenderness present.      Comments: Right hip incision-dressing is clean dry and intact   Skin:     General: Skin is warm and dry.      Coloration: Skin is not jaundiced.   Neurological:      Mental Status: She is alert. Mental status is at baseline.      Cranial Nerves: No cranial nerve deficit.      Sensory: No sensory deficit.    Psychiatric:         Mood and Affect: Mood normal.         Behavior: Behavior normal.       Fluids    Intake/Output Summary (Last 24 hours) at 11/1/2022 1140  Last data filed at 10/31/2022 1800  Gross per 24 hour   Intake --   Output 300 ml   Net -300 ml       Laboratory  Recent Labs     10/29/22  2300 10/31/22  0307 11/01/22  0240   WBC 8.3 6.5 5.7   RBC 2.92* 3.06* 2.49*   HEMOGLOBIN 9.1* 9.2* 7.6*   HEMATOCRIT 28.5* 30.5* 24.8*   MCV 97.6 99.7* 99.6*   MCH 31.2 30.1 30.5   MCHC 31.9* 30.2* 30.6*   RDW 55.9* 55.0* 54.6*   PLATELETCT 159* 165 150*   MPV 10.5 10.2 9.7     Recent Labs     10/29/22  2300 10/31/22  0307 11/01/22  0240   SODIUM 144 141 142   POTASSIUM 3.3* 3.5* 3.5*   CHLORIDE 107 105 109   CO2 24 26 24   GLUCOSE 101* 135* 111*   BUN 19 16 13   CREATININE 0.67 0.58 0.51   CALCIUM 8.6 8.6 8.0*                   Imaging  DX-BONE SURVEY-METASTATIC LTD   Final Result      1.  Skeletal survey with no obvious sclerotic or lytic metastatic disease.   2.  Multilevel compression deformities in the setting of osteopenia in the thoracic and lumbar spine. Indeterminate age. MRI of thoracic and lumbar spine may be of interest if recent or subacute insufficiency fractures are suspected.   3.  There is high index of suspicion for osseous metastatic disease, radionuclide bone scan would be recommended.      DX-HIP-UNILATERAL-W/O PELVIS-2/3 VIEWS RIGHT   Final Result      55.2 seconds fluoroscopy time utilized for RIGHT hip surgery.      DX-PORTABLE FLUORO > 1 HOUR   Final Result      Portable fluoroscopy utilized for 55 seconds.         INTERPRETING LOCATION: [address] [place] [address], [address]      EC-ECHOCARDIOGRAM COMPLETE W/O CONT   Final Result      MR-HIP-W/O RIGHT   Final Result      1.  Right femoral neck fracture with minimal associated edema      2.  This could indicate that the fracture is subacute      3.  Edema within the right adductor musculature consistent with a strain      CT-PELVIS W/O   Final Result          1.  Left pubic body fracture.   2.  Left superior pubic ramus/acetabular fracture.   3.  Incomplete fracture of the left inferior pubic ramus.   4.  Minimally displaced left sacral fracture.   5.  Minimally displaced right inferior pubic ramus fracture.   6.  Right superior pubic ramus/pubic body fracture.   7.  Questionable, nondisplaced right femoral neck fracture.   8.  Severe degenerative changes of the right hip joint.   9.  Acute-subacute appearing L4 compression fracture.   10.  Decreased osseous mineralization.   11.  Gas in the anterior left lower quadrant subcutaneous fat. Correlate for recent injection versus infection.   12.  Large rectal stool ball.      DX-PELVIS-TRAUMA SERIES  3-   Final Result      1.  Osteopenia.   2.  Possible right femoral neck fracture.   3.  Age-indeterminate right superior and inferior pubic rami fractures.   4.  Age-indeterminate left inferior ramus fracture.      CT-HEAD W/O    (Results Pending)          Assessment/Plan  * Elevated troponin- (present on admission)  Assessment & Plan  Trended down  Likely due to demand ischemia related to fall and fracture  Echo shows EF 65% with no wall motion abnormalities.    Multiple fractures of pelvis (HCC)- (present on admission)  Assessment & Plan  Given these plus the decreased bony mineralization plus the femoral fracture, checking SPEP as well as skeletal survey as recommended by Ortho  UPEP and SPEP pending. Skeletal survey does not show lytic or sclerotic lesions;multiple compressions of thoracic and lumbar spine    Elevated BNP- (present on admission)  Assessment & Plan  Echo unremarkable    Elevated bilirubin- (present on admission)  Assessment & Plan  Follow-up outpatient for further work-up    Closed hip fracture, right, initial encounter (HCC)- (present on admission)  Assessment & Plan  Ortho consulted  Ortho took to the OR for femoral screw       VTE prophylaxis: enoxaparin ppx    I have performed a physical exam and  reviewed and updated ROS and Plan today (11/1/2022). In review of yesterday's note (10/31/2022), there are no changes except as documented above.

## 2022-11-01 NOTE — CARE PLAN
The patient is Stable - Low risk of patient condition declining or worsening    Shift Goals  Clinical Goals: Pt/ot, rest  Patient Goals: Rest  Family Goals: CHELLY    Progress made toward(s) clinical / shift goals:  pt ut to chair. Decrease 02 to 2lpm sating 96%. O2 probe changed. Implemented IS    Patient is not progressing towards the following goals:

## 2022-11-01 NOTE — CARE PLAN
The patient is Stable - Low risk of patient condition declining or worsening    Shift Goals  Clinical Goals: Pt/ot, rest  Patient Goals: Rest  Family Goals: CHELLY    Progress made toward(s) clinical / shift goals:    Problem: Fall Risk  Goal: Patient will remain free from falls  Outcome: Progressing     Problem: Skin Integrity  Goal: Skin integrity is maintained or improved  Outcome: Progressing       Patient is not progressing towards the following goals:

## 2022-11-01 NOTE — PROGRESS NOTES
"   Orthopaedic PA Progress Note    Interval changes:SPEP ordered/pending. Skeletal survey done,bone scan recommended by KEVIN. Daughter at bedside - waiting for OT/PT to discuss POC.    ROS - Patient denies any new issues. No chest pain, dyspnea, or fever.  Pain well controlled.    /70   Pulse 92   Temp 36.8 °C (98.2 °F) (Temporal)   Resp 15   Ht 1.549 m (5' 1\")   Wt 51.8 kg (114 lb 3.2 oz)   SpO2 96%     Patient seen and examined  No acute distress  Breathing non labored  RRR  RIGHT LE:   Surgical dressing is clean, dry, and intact. Patient clearly fires tibialis anterior, EHL, and gastrocnemius/soleus. Sensation is intact to light touch throughout superficial peroneal, deep peroneal, tibial, saphenous, and sural nerve distributions. Strong and palpable 2+ dorsalis pedis and posterior tibial pulses with capillary refill less than 2 seconds. No lower leg tenderness or discomfort.  LEFT LE:  Bruising noted L greater trochanter. Patient clearly fires tibialis anterior, EHL, and gastrocnemius/soleus. Sensation is intact to light touch throughout superficial peroneal, deep peroneal, tibial, saphenous, and sural nerve distributions. Strong and palpable 2+ dorsalis pedis and posterior tibial pulses with capillary refill less than 2 seconds. No lower leg tenderness or discomfort.    Recent Labs     10/29/22  2300 10/31/22  0307 11/01/22  0240   WBC 8.3 6.5 5.7   RBC 2.92* 3.06* 2.49*   HEMOGLOBIN 9.1* 9.2* 7.6*   HEMATOCRIT 28.5* 30.5* 24.8*   MCV 97.6 99.7* 99.6*   MCH 31.2 30.1 30.5   MCHC 31.9* 30.2* 30.6*   RDW 55.9* 55.0* 54.6*   PLATELETCT 159* 165 150*   MPV 10.5 10.2 9.7   S  Active Hospital Problems    Diagnosis     Multiple fractures of pelvis ([facility]) [S32.82XA]     Hypoxia [R09.02]     Elevated troponin [R77.8]     Closed hip fracture, right, initial encounter ([facility]) [S72.001A]     Elevated bilirubin [R17]     Elevated BNP [R79.89]      Assessment/Plan:  POD#2 S/P PSF R femoral neck  Wt bearing status - " as tolerated  PT/OT-initiated  Wound care:dressing left in place  Drains - no  Cruz-no  Sutures/Staples out- 10-14 days post operatively  Antibiotics: complete  DVT Prophylaxis- TEDS/SCDs/Foot pumps.   Lovenox: 40 mg daily, Duration-until ambulatory > 150'  Future Procedures - none planned  Case Coordination for Discharge Planning - Disposition per MED.  pending further therapy workup  Follow-Up: needs appointment with Dr. López at [facility] at 10-14 days post-op for re-evaluation, staple removal and radiographs.

## 2022-11-01 NOTE — DISCHARGE PLANNING
Received Choice form at [phone]  Agency/Facility Name: [facility]   Referral sent per Choice form @ [phone]

## 2022-11-02 ENCOUNTER — APPOINTMENT (OUTPATIENT)
Dept: RADIOLOGY | Facility: MEDICAL CENTER | Age: 82
DRG: 956 | End: 2022-11-02
Attending: STUDENT IN AN ORGANIZED HEALTH CARE EDUCATION/TRAINING PROGRAM
Payer: MEDICARE

## 2022-11-02 ENCOUNTER — PATIENT OUTREACH (OUTPATIENT)
Dept: SCHEDULING | Facility: IMAGING CENTER | Age: 82
End: 2022-11-02
Payer: MEDICARE

## 2022-11-02 ENCOUNTER — APPOINTMENT (OUTPATIENT)
Dept: RADIOLOGY | Facility: MEDICAL CENTER | Age: 82
DRG: 956 | End: 2022-11-02
Payer: MEDICARE

## 2022-11-02 VITALS
DIASTOLIC BLOOD PRESSURE: 74 MMHG | TEMPERATURE: 98.1 F | OXYGEN SATURATION: 94 % | BODY MASS INDEX: 21.56 KG/M2 | HEIGHT: 61 IN | WEIGHT: 114.2 LBS | SYSTOLIC BLOOD PRESSURE: 138 MMHG | RESPIRATION RATE: 16 BRPM | HEART RATE: 89 BPM

## 2022-11-02 LAB
ALBUMIN SERPL BCP-MCNC: 3.1 G/DL (ref 3.2–4.9)
ANION GAP SERPL CALC-SCNC: 12 MMOL/L (ref 7–16)
BUN SERPL-MCNC: 11 MG/DL (ref 8–22)
CALCIUM SERPL-MCNC: 8.1 MG/DL (ref 8.5–10.5)
CHLORIDE SERPL-SCNC: 105 MMOL/L (ref 96–112)
CO2 SERPL-SCNC: 24 MMOL/L (ref 20–33)
CREAT SERPL-MCNC: 0.44 MG/DL (ref 0.5–1.4)
D DIMER PPP IA.FEU-MCNC: 6.3 UG/ML (FEU) (ref 0–0.5)
ERYTHROCYTE [DISTWIDTH] IN BLOOD BY AUTOMATED COUNT: 54 FL (ref 35.9–50)
GFR SERPLBLD CREATININE-BSD FMLA CKD-EPI: 97 ML/MIN/1.73 M 2
GLUCOSE SERPL-MCNC: 101 MG/DL (ref 65–99)
HCT VFR BLD AUTO: 28 % (ref 37–47)
HGB BLD-MCNC: 8.4 G/DL (ref 12–16)
MCH RBC QN AUTO: 29.9 PG (ref 27–33)
MCHC RBC AUTO-ENTMCNC: 30 G/DL (ref 33.6–35)
MCV RBC AUTO: 99.6 FL (ref 81.4–97.8)
PHOSPHATE SERPL-MCNC: 2.5 MG/DL (ref 2.5–4.5)
PLATELET # BLD AUTO: 176 K/UL (ref 164–446)
PMV BLD AUTO: 10.2 FL (ref 9–12.9)
POTASSIUM SERPL-SCNC: 3.4 MMOL/L (ref 3.6–5.5)
PROCALCITONIN SERPL-MCNC: 0.14 NG/ML
RBC # BLD AUTO: 2.81 M/UL (ref 4.2–5.4)
SARS-COV+SARS-COV-2 AG RESP QL IA.RAPID: NOTDETECTED
SODIUM SERPL-SCNC: 141 MMOL/L (ref 135–145)
SPECIMEN SOURCE: NORMAL
WBC # BLD AUTO: 5.3 K/UL (ref 4.8–10.8)

## 2022-11-02 PROCEDURE — 85379 FIBRIN DEGRADATION QUANT: CPT

## 2022-11-02 PROCEDURE — 80069 RENAL FUNCTION PANEL: CPT

## 2022-11-02 PROCEDURE — 700117 HCHG RX CONTRAST REV CODE 255

## 2022-11-02 PROCEDURE — 87426 SARSCOV CORONAVIRUS AG IA: CPT

## 2022-11-02 PROCEDURE — 85027 COMPLETE CBC AUTOMATED: CPT

## 2022-11-02 PROCEDURE — A9270 NON-COVERED ITEM OR SERVICE: HCPCS | Performed by: STUDENT IN AN ORGANIZED HEALTH CARE EDUCATION/TRAINING PROGRAM

## 2022-11-02 PROCEDURE — 700102 HCHG RX REV CODE 250 W/ 637 OVERRIDE(OP): Performed by: STUDENT IN AN ORGANIZED HEALTH CARE EDUCATION/TRAINING PROGRAM

## 2022-11-02 PROCEDURE — 84145 PROCALCITONIN (PCT): CPT

## 2022-11-02 PROCEDURE — 93970 EXTREMITY STUDY: CPT

## 2022-11-02 PROCEDURE — 99239 HOSP IP/OBS DSCHRG MGMT >30: CPT | Performed by: STUDENT IN AN ORGANIZED HEALTH CARE EDUCATION/TRAINING PROGRAM

## 2022-11-02 PROCEDURE — 36415 COLL VENOUS BLD VENIPUNCTURE: CPT

## 2022-11-02 PROCEDURE — 700105 HCHG RX REV CODE 258: Performed by: HOSPITALIST

## 2022-11-02 PROCEDURE — 71275 CT ANGIOGRAPHY CHEST: CPT

## 2022-11-02 RX ORDER — OXYCODONE HYDROCHLORIDE 5 MG/1
5 TABLET ORAL EVERY 4 HOURS PRN
Status: DISCONTINUED | OUTPATIENT
Start: 2022-11-02 | End: 2022-11-02 | Stop reason: HOSPADM

## 2022-11-02 RX ORDER — OXYCODONE HYDROCHLORIDE 5 MG/1
5 TABLET ORAL EVERY 4 HOURS PRN
Qty: 15 TABLET | Refills: 0 | Status: SHIPPED | OUTPATIENT
Start: 2022-11-02 | End: 2022-11-05

## 2022-11-02 RX ORDER — OXYCODONE HYDROCHLORIDE 10 MG/1
10 TABLET ORAL EVERY 4 HOURS PRN
Status: DISCONTINUED | OUTPATIENT
Start: 2022-11-02 | End: 2022-11-02 | Stop reason: HOSPADM

## 2022-11-02 RX ORDER — OXYCODONE HYDROCHLORIDE 5 MG/1
5 TABLET ORAL EVERY 4 HOURS PRN
Qty: 15 TABLET | Refills: 0 | Status: SHIPPED | OUTPATIENT
Start: 2022-11-02 | End: 2022-11-02 | Stop reason: SDUPTHER

## 2022-11-02 RX ORDER — ENOXAPARIN SODIUM 100 MG/ML
30 INJECTION SUBCUTANEOUS DAILY
Status: SHIPPED
Start: 2022-11-02

## 2022-11-02 RX ORDER — OXYCODONE HYDROCHLORIDE 10 MG/1
10 TABLET ORAL EVERY 4 HOURS PRN
Status: DISCONTINUED | OUTPATIENT
Start: 2022-11-02 | End: 2022-11-02

## 2022-11-02 RX ADMIN — IOHEXOL 45 ML: 350 INJECTION, SOLUTION INTRAVENOUS at 06:30

## 2022-11-02 RX ADMIN — OXYCODONE HYDROCHLORIDE 10 MG: 10 TABLET ORAL at 13:22

## 2022-11-02 RX ADMIN — SODIUM CHLORIDE: 9 INJECTION, SOLUTION INTRAVENOUS at 03:32

## 2022-11-02 NOTE — CARE PLAN
The patient is Stable - Low risk of patient condition declining or worsening    Shift Goals  Clinical Goals: Monitor h&h, pt will be hemodynamically stable  Patient Goals: Rest  Family Goals: CHELLY      Problem: Fall Risk  Goal: Patient will remain free from falls  Outcome: Progressing         Problem: Knowledge Deficit - Standard  Goal: Patient and family/care givers will demonstrate understanding of plan of care, disease process/condition, diagnostic tests and medications  Outcome: Not Met

## 2022-11-02 NOTE — PROGRESS NOTES
Report received from Chandler Russell. Assumed pt care. Pt getting labs drawn. Pt A&O x 2. Fall precautions in place, call light and belongings within reach, bed in lowest position. No signs of distress.

## 2022-11-02 NOTE — DISCHARGE PLANNING
DC Transport Scheduled    Received request at: 11/2/2022 1120    Transport Company Scheduled:  [employer]  Spoke with Alana at [facility] to schedule transport.    Scheduled Date: 11/2/2022  Scheduled Time: 1400    Destination: [facility]   [address] [place] [address] Ln [address]ago [address]ero    Notified care team of scheduled transport via [employer].     If there are any changes needed to the DC transportation scheduled, please contact [facility] at ext. [phone] between the hours of 8910-7082 Mon-Fri. If outside those hours, contact the ED Case Manager at ext. [phone].

## 2022-11-02 NOTE — CARE PLAN
The patient is Stable - Low risk of patient condition declining or worsening    Shift Goals  Clinical Goals: Monitor h&h, pt will be hemodynamically stable  Patient Goals: Rest  Family Goals: CHELLY    Progress made toward(s) clinical / shift goals:    Problem: Fall Risk  Goal: Patient will remain free from falls  Outcome: Progressing     Problem: Skin Integrity  Goal: Skin integrity is maintained or improved  Outcome: Progressing       Patient is not progressing towards the following goals:

## 2022-11-02 NOTE — PROGRESS NOTES
Pt o2 demands decrease from 2lpm to 4lpm. Pt sating 91%. Pt does not use home o2. Pt stated she use to be a smoker. Implemented IS. Paged on call provider Joaquim. Orders are being placed.

## 2022-11-02 NOTE — PROGRESS NOTES
Received report from NOC shift RN. Patient is resting and sleeping in bed comfortably, VSS, no signs of distress. Bed in lowest and locked position, call light in reach, bed alarm on, will continue to monitor.

## 2022-11-02 NOTE — DISCHARGE INSTRUCTIONS
Discharge Instructions    Discharged to other by medical transportation with escort. Discharged via wheelchair, hospital escort: Yes.  Special equipment needed: Not Applicable    Be sure to schedule a follow-up appointment with your primary care doctor or any specialists as instructed.     Discharge Plan:   Diet Plan: Discussed  Activity Level: Discussed  Confirmed Follow up Appointment: Appointment Scheduled  Confirmed Symptoms Management: Discussed  Medication Reconciliation Updated: Yes  Influenza Vaccine Indication: Patient Refuses    I understand that a diet low in cholesterol, fat, and sodium is recommended for good health. Unless I have been given specific instructions below for another diet, I accept this instruction as my diet prescription.   Other diet: NA    Special Instructions: Discharge instructions for the Orthopedic Patient    Follow up with Primary Care Physician within 2 weeks of discharge to home, regarding:  Review of medications and diagnostic testing.  Surveillance for medical complications.  Workup and treatment of osteoporosis, if appropriate.     -Is this a Hip/Knee/Shoulder Joint Replacement patient? Yes   TOTAL HIP REPLACEMENT, AFTER-CARE GUIDELINES     These instructions provide you with information on caring for yourself and your hip after surgery. Your health care provider may also give you instructions that are more specific. Your treatment was planned and performed according to current medical practices but problems sometimes occur. Call your health care provider if you have any problems or questions.     WHAT TO EXPECT AFTER THE PROCEDURE   After your procedure, your hip will typically be stiff, sore, and bruised. This will improve over time.     Pain   Follow your home pain management plan as discussed with your nurse and as directed by your provider.   It is important to follow any scheduled pain medications for maximal pain relief.   If prescribed opioid medication, the goal is to  use opioids only as needed and to wean off prescription pain medicine as soon as possible.   Ice use for pain control.  Put ice in a plastic bag.   Place a towel between your skin and the bag.   Leave the ice on for 20 minutes, 2-3 times a day at a minimum.   Most patients are off the pain pills by 3 weeks. If your pain continues to be severe, follow up with your provider.     Infection   Deep hip joint infections that require removal of the prostheses occur in less than 1.0% of patients. Lesser infections in the skin (cellulites) are more common and much more easily treated.   Keep the incision as clean and dry as possible.   Always wash your hands before touching your incision.   Avoid dental care for 3 months after surgery. Your provider may recommend taking a dose of antibiotics an hour prior to any dental procedure. After 2 years, most providers recommend antibiotics only before an extensive procedure. Ask your provider what they recommend.   Signs and symptoms of infection include low-grade fever, redness, pain, swelling and drainage from your incision. Notify your provider IMMEDIATELY if you develop ANY of these symptoms.     Post op Disturbances   Bowel Habits - constipation is extremely common and caused by a combination of anesthesia, lack of mobility, dehydration and pain medicine. Use stool softeners or laxatives if necessary. It is important not to ignore this problem as bowel obstructions can be a serious complication after joint replacement surgery.   Mood/Energy Level - Many patients experience a lack of energy and endurance for up to 2-3 months after surgery. Some people feel down and can even become depressed. This is likely due to postoperative anemia, change in activity level, lack of sleep, pain medicine and just the emotional reaction to the surgery itself that is a big disruption in a person’s life. This usually passes. If symptoms persist, follow up with your primary care provider.    Returning to Work - Your provider will give you specific instructions based on your profession. Generally, if you work a sedentary job requiring little standing or walking, most patients may return within 2-6 weeks. Manual labor jobs involving walking, lifting and standing may take 3-4 months. Your provider’s office can provide a release to part-time or light duty work early on in your recovery and progress you to full duty as able.   Driving - You can begin driving once cleared by your provider, provided you are no longer taking narcotic pain medication or any other medications that impair driving. Discuss the length of time with your doctor as returning to driving will depend on things such as your vehicle, which hip was replaced (right or left) and if you do or do not have post-operative movement precautions.   Avoiding falls - A fall during the first few weeks after surgery can damage your new hip and may result in a need for further surgery.  throw rugs and tack down loose carpeting. Be aware of floor hazards such as pets, small objects or uneven surfaces. Notify your provider of any falls.   Airport Metal Detectors - The sensitivity of metal detectors varies and it is likely that your prosthesis will cause an alarm. Inform the  of your artificial joint.     Diet   Resume your normal diet as tolerated.   It is important to achieve a healthy nutritional status by eating a well-balanced diet on a regular basis.   Your provider may recommend that you take iron and vitamin supplements.   Continue to drink plenty of fluids.     Shower/Bathing   You may shower as soon as you get home from the hospital unless otherwise instructed.   Keep your incision out of water to prevent infection. To keep the incision dry when showering, cover it with a plastic bag or plastic wrap. If your bandage is waterproof, this may not be necessary.   Pat incision dry if it gets wet. Do not rub. Notify your  provider.   Do not submerge in a bath until cleared by your provider. Your staples must be out and the incision completely healed.     Dressing Changes: Only change your dressing if directed by your provider.   Wash hands.   Open all dressing change materials.   Remove old dressing and discard.   Inspect incision for signs of irritation or infection including redness, increase in clear drainage, yellow/green drainage, odor and surrounding skin hot to touch. Notify your provider if present.    the new dressing by one corner and lay over the incision. Be careful not to touch the inside of the dressing that will lay over the incision.   Secure in place as instructed.     Swelling/Bruising   Swelling is normal after hip replacement and can involve the thigh, knee, calf and foot.   Swelling can last from 3-6 months.   To reduce swelling, elevate your leg higher than your heart while reclining. The first week you are home you should elevate your leg an equal amount of time as you are active.   The swelling is usually worse after you go home since you are upright for longer periods of time.   Bruising often does not appear until after you arrive home and can be quite dramatic- appearing purple, black, or green. Bruising is typically not concerning and will subside without any treatment.     Blood Clot Prevention   Your treatment plan includes multiple preventative measures to decrease the risk of blood clots in the legs (DVTs) and the less common, but serious, clots that travel to the lungs (pulmonary emboli). Most patients are at standard risk for them, but people who are at higher risk include those who have had previous clots, a family history of clotting, smoking, diabetes, obesity, advanced age, use estrogen and/or live a sedentary lifestyle.     Signs of blood clots in legs include - Swelling in thigh, calf or ankle that does not go down with elevation. Pain, heat and tenderness in calf, back of calf or groin  area. NOTE: blood clots can occur in either leg.   Signs of blood clots in lungs include - Sudden increased shortness of breath, sudden onset of chest pain, and localized chest pain with coughing.   If you experience any of the above symptoms, notify your provider and seek medical attention immediately.   You received anticoagulant therapy (blood thinners) in the hospital. Continue the prescribed blood-thinning medication at home, as directed by your provider.   Your risk for developing a clot continues for up to 2-3 months after surgery. Avoid prolonged sitting and dehydration (long air trips and car trips). If you do take a trip during this time, please get up, move around every 1-1.5 hours, and discuss all travel plans with your provider.     Activity   Once you get home, stay active. The key is not to overdo it. While you can expect some good days and some bad days, you should notice a gradual improvement over time and a gradual increase in endurance over the next 6 to 12 months.     Weight Bearing - You can begin to bear weight as tolerated unless otherwise directed by your provider. Use a walker, crutches or cane to assist with walking until you can walk smoothly (minimal or no limp) without assistance.   Physical Precautions - To assure proper recovery and tissue healing, you may be asked to take special precautions when moving (sitting, bending or sleeping) - usually for the first 6 weeks after surgery. Precautions vary from patient to patient depending on surgical approach used by your provider. Follow any specific precautions provided by your provider and physical therapist until cleared by your provider.   Sitting - Early on it is easier to get up from a tall chair or a chair with arms. The physical therapist will show you how to sit and stand from a chair keeping your affected leg out in front of you. Get up and move around on a regular basis--at least once every hour.   Walking - Walk as much as you  like once your doctor gives permission to proceed, but remember that walking is no substitute for your prescribed exercises.  Follow the home exercise program prescribed during your hospital stay as directed by your physical therapist or provider.   Continued physical therapy may be prescribed after hospital discharge to strengthen your muscles and improve your gait/walking pattern. The decision to have therapy or not after the hospital stay is made by you and your provider prior to surgery or at your follow up appointment.   Swimming is an excellent low impact activity; you can begin as soon as the wound is healed and your provider clears you. Using a pair of training fins may make swimming a more enjoyable and effective exercise.   Sexual activity - Your provider can tell you when it is safe to resume sexual activity.   Other activities - Low impact activities are preferred. If you have specific questions, consult your provider.     When to Call the Doctor   Call the provider if you experience:   Fever over 100.5° F   Increased pain, drainage, redness, odor or heat around the incision area   Shaking chills   Increased knee pain with activity and rest   Increased pain in calf, tenderness or redness above or below the knee   Increased swelling of calf, ankle, foot   Sudden increased shortness of breath, sudden onset of chest pain, localized chest pain with coughing   Incision opening   Or, if there are any questions or concerns about medications or care.     Infection statistic resource:   https://www.[employer].com/contents/prosthetic-joint-infection-epidemiology-microbiology-clinical-manifestations-and-diagnosis     -Is this patient being discharged with medication to prevent blood clots?  No    -Is this patient being discharged with medication to prevent blood clots?  No    Is patient discharged on Warfarin / Coumadin?   No

## 2022-11-02 NOTE — PROGRESS NOTES
Patient is A&Ox1. Discharge instructions. Personal belongings in possession with patient. PIV and tele monitor removed. Copy of discharge instructions in patient chart, signed and reviewed. Patient verbalizes the understanding of the discharge instructions. Questions and concerns addressed prior to leaving the unit.  Transported via [place] by [facility]. Patient discharged to [facility]. Family present.

## 2022-11-02 NOTE — DISCHARGE PLANNING
Per fax response [facility], and [facility] accepted Pt.     [phone]  Agency/Facility Name: [facility]   Outcome: DPA left vmail regarding medical clearance update with request of call back.     [phone]  Agency/Facility Name: [facility]   Spoke To: Evelyn   Outcome: Evelyn called to back to notify [facility] has beds today, and Pt can be accepted to facility at 1400. Evelyn also requesting updates SSN, Pt's SSN on facesheet is on default. DPA to call with updates.     [phone]  Agency/Facility Name: [facility]   Spoke To: Evelyn   Outcome: Evelyn called to notify DPA, Evelyn may have found SSN. Evelyn requesting PASRR.

## 2022-11-02 NOTE — THERAPY
"Physical Therapy   Daily Treatment     Patient Name: Bassam Sheehan  Age:  81 y.o., Sex:  female  Medical Record #: [id number]  Today's Date: 11/1/2022     Precautions  Precautions: Fall Risk;Weight Bearing As Tolerated Right Lower Extremity  Comments: s/p R IMN    Assessment    Patient seen for follow up PT treatment session and is motivated to work with therapy. She demos impaired motor-planning and command following. She demos sufficient strength for gait but in standing she is unable to complete weight shifting despite assist and tactile cues.  She was able to stand x4 w/FWW and then needed a MaxA stand pivot transfer to the chair. She will need placement for further therapy. Will continue to follow.     Plan    Continue current treatment plan.    DC Equipment Recommendations: Unable to determine at this time  Discharge Recommendations: Recommend post-acute placement for additional physical therapy services prior to discharge home      Subjective    \"I can't explain it, but it's like there's a lump in my hip\"     Objective       11/01/22 1600   Precautions   Precautions Fall Risk;Weight Bearing As Tolerated Right Lower Extremity   Comments s/p R IMN   Vitals   Pulse (!) 123  (with mobility)   Pulse Oximetry (!) 86 %  (with mobility)   O2 (LPM) 3  (02 probe changed)   O2 Delivery Device Silicone Nasal Cannula   Pain 0 - 10 Group   Therapist Pain Assessment 0;Post Activity Pain Same as Prior to Activity   Cognition    Cognition / Consciousness X   Speech/ Communication Word Finding Impairment   Orientation Level Not Oriented to Year;Not Oriented to Place;Not Oriented to Time;Not Oriented to Reason   Level of Consciousness Alert   Ability To Follow Commands 1 Step   New Learning Impaired   Attention Impaired   Sequencing Impaired   Initiation Impaired   Comments impaired motor-planning and needs repetition for cueing   Strength Lower Body   Lower Body Strength  X   Gross Strength Generalized Weakness, Equal Bilaterally "   Sitting Lower Body Exercises   Sitting Lower Body Exercises Yes   Ankle Pumps 1 set of 10   Long Arc Quad 1 set of 10   Neuro-Muscular Treatments   Neuro-Muscular Treatments Compensatory Strategies;Postural Facilitation;Postural Changes;Verbal Cuing;Tactile Cuing;Taping;Weight Shift Right;Weight Shift Left   Comments cueing for upright posture and weight shifting to facilitate gait   Other Treatments   Other Treatments Provided educated on IS, patient with poor ability to learn and retain information   Balance   Sitting Balance (Static) Fair   Sitting Balance (Dynamic) Fair -   Standing Balance (Static) Poor +   Standing Balance (Dynamic) Poor   Weight Shift Sitting Poor   Weight Shift Standing Poor   Skilled Intervention Postural Facilitation;Tactile Cuing;Verbal Cuing   Gait Analysis   Gait Level Of Assist Unable to Participate   Bed Mobility    Supine to Sit Moderate Assist   Scooting Moderate Assist   Rolling Moderate Assist to Lt.   Skilled Intervention Sequencing;Tactile Cuing;Verbal Cuing   Functional Mobility   Sit to Stand Moderate Assist   Bed, Chair, Wheelchair Transfer Maximal Assist   Transfer Method Stand Pivot   Mobility STSx5 with repeated cueing for hand placement. Patient continues to lean on forearms and is unable to advance LE forwards   Skilled Intervention Postural Facilitation;Sequencing;Compensatory Strategies;Tactile Cuing;Verbal Cuing;Facilitation   How much difficulty does the patient currently have...   Turning over in bed (including adjusting bedclothes, sheets and blankets)? 2   Sitting down on and standing up from a chair with arms (e.g., wheelchair, bedside commode, etc.) 2   Moving from lying on back to sitting on the side of the bed? 2   How much help from another person does the patient currently need...   Moving to and from a bed to a chair (including a wheelchair)? 2   Need to walk in a hospital room? 2   Climbing 3-5 steps with a railing? 1   6 clicks Mobility Score 11    Activity Tolerance   Sitting in Chair post session   Sitting Edge of Bed 5 min   Standing 2 min   Patient / Family Goals    Patient / Family Goal #1 to go home   Short Term Goals    Short Term Goal # 1 in 6 visits patient will demo all functional transfers with Joel and FWW for safe DC   Goal Outcome # 1 Progressing as expected   Short Term Goal # 2 in 6 visits patient will ambulate 100' with Joel and FWW for safe DC   Goal Outcome # 2 Progressing as expected   Short Term Goal # 3 in 6 visits patient will demo all bed mobility indep for safe DC   Goal Outcome # 3 Progressing as expected   Anticipated Discharge Equipment and Recommendations   DC Equipment Recommendations Unable to determine at this time   Discharge Recommendations Recommend post-acute placement for additional physical therapy services prior to discharge home     Ally Morales, PT, DPT, GCS

## 2022-11-02 NOTE — DISCHARGE SUMMARY
Discharge Summary    CHIEF COMPLAINT ON ADMISSION  No chief complaint on file.      Reason for Admission  Elevated troponin    Admission Date  10/29/2022     CODE STATUS  Full Code    HPI & HOSPITAL COURSE    Bassam Sheehan is a 81 year old female retired [employer]  with a h/o Emphysema (5ppd x30 years, but says she did not smoke all of the cigarettes that she lit, quit at age 51)  She presented with L hip pain on 10/29/2022. She is unsure of how she fell but it did cause her to have an acute onset of L hip pain after the fall. She was ambulatory after the fall with a cane, but 1 day later she lost the ability to ambulate and her friend advised her to go the ED for further evaluation.   She was admitted.  While her pain was on the left, she was found to have a right femoral neck fracture on MRI.  Ortho took her for OR repair.  She had elevated troponin which is likely demand ischemia and trended down.  She also had multiple fractures in her pelvis.  Thus, SPEP and skeletal survey were ordered.  OT recommended SNF so she was referred.    Patient tolerated procedure well.  She worked with physical therapy and Occupational Therapy and was found to need additional physical therapy and Occupational Therapy prior to discharging home.  Patient will discharge to SNF.  Patient and hospitalization stay was found to have an elevated D-dimer however no PE or DVT were noted on CTA or Doppler ultrasound.  Patient will continue Lovenox until she is ambulatory as per orthopedic surgery recommendations.  Prescription for pain medication provided.    Therefore, she is discharged in good and stable condition to skilled nursing facility.    The patient met 2-midnight criteria for an inpatient stay at the time of discharge.      FOLLOW UP ITEMS POST DISCHARGE  Closed hip fracture follow-up with Dr. López in 10 to 14 days    DISCHARGE DIAGNOSES  Principal Problem:    Elevated troponin POA: Yes  Active Problems:    Closed hip  fracture, right, initial encounter (HCC) POA: Yes    Elevated bilirubin POA: Yes    Elevated BNP POA: Yes    Hypoxia POA: Yes    Multiple fractures of pelvis (HCC) POA: Yes  Resolved Problems:    * No resolved hospital problems. *      FOLLOW UP  No future appointments.  Raciel López M.D.  [address] [place] [id number] [phone]    Schedule an appointment as soon as possible for a visit in 2 week(s)  for staple removal and xrays    [address]  [place] [address]  [phone]        MEDICATIONS ON DISCHARGE     Medication List        START taking these medications        Instructions   enoxaparin 30 MG/0.3ML Sosy inj  Commonly known as: Lovenox   Doctor's comments: Continue until patient is ambulatory  Inject 0.3 mL under the skin every day at 6 PM.  Dose: 30 mg     oxyCODONE immediate-release 5 MG Tabs  Commonly known as: ROXICODONE   Take 1 Tablet by mouth every four hours as needed for Severe Pain for up to 3 days.  Dose: 5 mg              Allergies  Not on File    DIET  Orders Placed This Encounter   Procedures    Diet Order Diet: Regular     Standing Status:   Standing     Number of Occurrences:   1     Order Specific Question:   Diet:     Answer:   Regular [1]       ACTIVITY  As tolerated and directed by skilled nursing.  Weight bearing as tolerated    LINES, DRAINS, AND WOUNDS  This is an automated list. Peripheral IVs will be removed prior to discharge.  Peripheral IV 11/02/22 20 G Right Antecubital (Active)   Site Assessment Clean;Dry;Intact 11/02/22 0800   Dressing Type Transparent 11/02/22 0800   Line Status Saline locked 11/02/22 0800   Dressing Status Clean;Dry;Intact 11/02/22 0800   Dressing Intervention N/A 11/02/22 0800   Infiltration Grading ([facility], [facility]) 0 11/02/22 0800   Phlebitis Scale ([facility] Only) 0 11/02/22 0800       Peripheral IV 11/02/22 20 G Anterior;Distal;Right Forearm (Active)   Site Assessment Clean;Dry;Intact 11/02/22 0800    Dressing Type Transparent 11/02/22 0800   Line Status Infusing 11/02/22 0800   Dressing Status Clean;Dry;Intact 11/02/22 0800   Dressing Intervention N/A 11/02/22 0800   Infiltration Grading ([facility], [facility]) 0 11/02/22 0800   Phlebitis Scale ([facility] Only) 0 11/02/22 0800       Wound 10/30/22 Incision Hip Right SUTURES, STAPLES, XEROFORM, 4X4, TEGADERM (Active)   Site Assessment CHELLY 11/02/22 0800   Periwound Assessment CHELLY 11/02/22 0800   Margins CHELLY 11/02/22 0800   Closure Adhesive bandage 11/02/22 0800   Drainage Amount None 11/02/22 0800   Dressing Options Dry Gauze;Transparent Film 11/02/22 0800   Dressing Changed Observed 11/02/22 0800   Dressing Status Clean;Dry;Intact 11/02/22 0800       Peripheral IV 11/02/22 20 G Right Antecubital (Active)   Site Assessment Clean;Dry;Intact 11/02/22 0800   Dressing Type Transparent 11/02/22 0800   Line Status Saline locked 11/02/22 0800   Dressing Status Clean;Dry;Intact 11/02/22 0800   Dressing Intervention N/A 11/02/22 0800   Infiltration Grading ([facility], [facility]) 0 11/02/22 0800   Phlebitis Scale ([facility] Only) 0 11/02/22 0800       Peripheral IV 11/02/22 20 G Anterior;Distal;Right Forearm (Active)   Site Assessment Clean;Dry;Intact 11/02/22 0800   Dressing Type Transparent 11/02/22 0800   Line Status Infusing 11/02/22 0800   Dressing Status Clean;Dry;Intact 11/02/22 0800   Dressing Intervention N/A 11/02/22 0800   Infiltration Grading ([facility], [facility]) 0 11/02/22 0800   Phlebitis Scale ([facility] Only) 0 11/02/22 0800               MENTAL STATUS ON TRANSFER   A&Ox2          CONSULTATIONS  Orthopedic surgery-Dr. López    PROCEDURES  10/30: Right hip percutaneous screw fixation       LABORATORY  Lab Results   Component Value Date    SODIUM 141 11/02/2022    POTASSIUM 3.4 (L) 11/02/2022    CHLORIDE 105 11/02/2022    CO2 24 11/02/2022    GLUCOSE 101 (H) 11/02/2022    BUN 11 11/02/2022    CREATININE 0.44 (L) 11/02/2022        Lab Results   Component Value Date    WBC 5.3 11/02/2022     HEMOGLOBIN 8.4 (L) 11/02/2022    HEMATOCRIT 28.0 (L) 11/02/2022    PLATELETCT 176 11/02/2022        Total time of the discharge process exceeds 34 minutes.

## 2022-11-04 LAB
ALBUMIN SERPL ELPH-MCNC: 2.52 G/DL (ref 3.75–5.01)
ALPHA1 GLOB SERPL ELPH-MCNC: 0.44 G/DL (ref 0.19–0.46)
ALPHA2 GLOB SERPL ELPH-MCNC: 0.68 G/DL (ref 0.48–1.05)
B-GLOBULIN SERPL ELPH-MCNC: 0.65 G/DL (ref 0.48–1.1)
GAMMA GLOB SERPL ELPH-MCNC: 0.62 G/DL (ref 0.62–1.51)
INTERPRETATION SERPL IFE-IMP: ABNORMAL
MONOCLON BAND OBS SERPL: ABNORMAL
MONOCLONAL PROTEIN NL11656: ABNORMAL G/DL
PATHOLOGY STUDY: ABNORMAL
PROT SERPL-MCNC: 4.9 G/DL (ref 6.3–8.2)

## 2022-11-07 LAB
ALBUMIN 24H MFR UR ELPH: 17.3 %
ALPHA1 GLOB 24H MFR UR ELPH: 10.8 %
ALPHA2 GLOB 24H MFR UR ELPH: 19.2 %
B-GLOBULIN 24H MFR UR ELPH: 26.9 %
COLLECT DURATION TIME SPEC: NORMAL HRS
EER MONOCLONAL PROTEIN STUDY, 24 HOUR U Q5964: NORMAL
GAMMA GLOB 24H MFR UR ELPH: 25.8 %
INTERPRETATION UR IFE-IMP: NORMAL
M PROTEIN 24H MFR UR ELPH: 0 %
M PROTEIN 24H UR ELPH-MRATE: NORMAL MG/24 HRS
PROT 24H UR-MRATE: NORMAL MG/D (ref 40–150)
PROT UR-MCNC: 34 MG/DL
SPECIMEN VOL ?TM UR: NORMAL ML

## 2022-11-07 NOTE — DISCHARGE PLANNING
This RNCM has received multiple calls from pt daughter, Laura, voicing concerns for pt. Laura is requesting transfer to the SNF in [place]. RNCM PC to the [facility], the LSW at [facility], the DON of [facility]; left voice msgs. Awaiting callbacks.

## 2022-11-22 ENCOUNTER — HOSPITAL ENCOUNTER (OUTPATIENT)
Dept: RADIOLOGY | Facility: MEDICAL CENTER | Age: 82
End: 2022-11-22
Payer: MEDICARE

## (undated) DEVICE — SET EXTENSION WITH 2 PORTS (48EA/CA) ***PART #2C8610 IS A SUBSTITUTE*****

## (undated) DEVICE — SET LEADWIRE 5 LEAD BEDSIDE DISPOSABLE ECG (1SET OF 5/EA)

## (undated) DEVICE — GLOVE BIOGEL SZ 7.5 SURGICAL PF LTX - (50PR/BX 4BX/CA)

## (undated) DEVICE — GUIDEPIN FOR 7.3MM CANNULATED SCREWS 2.8MM X 450MM (3TX6=18)(6EA/PK)

## (undated) DEVICE — SUCTION INSTRUMENT YANKAUER BULBOUS TIP W/O VENT (50EA/CA)

## (undated) DEVICE — LACTATED RINGERS INJ 1000 ML - (14EA/CA 60CA/PF)

## (undated) DEVICE — COVER LIGHT HANDLE ALC PLUS DISP (18EA/BX)

## (undated) DEVICE — SENSOR OXIMETER ADULT SPO2 RD SET (20EA/BX)

## (undated) DEVICE — GOWN WARMING STANDARD FLEX - (30/CA)

## (undated) DEVICE — STAPLER SKIN DISP - (6/BX 10BX/CA) VISISTAT

## (undated) DEVICE — CHLORAPREP 3 ML APPLICATOR - (25/BX 4BX/CS 100/CS)

## (undated) DEVICE — SUTURE GENERAL

## (undated) DEVICE — PACK MAJOR ORTHO - (2EA/CA)

## (undated) DEVICE — Device

## (undated) DEVICE — SLEEVE, VASO, THIGH, MED

## (undated) DEVICE — SODIUM CHL IRRIGATION 0.9% 1000ML (12EA/CA)

## (undated) DEVICE — SUTURE 2-0 VICRYL PLUS CT-1 - 8 X 18 INCH(12/BX)

## (undated) DEVICE — TUBING CLEARLINK DUO-VENT - C-FLO (48EA/CA)

## (undated) DEVICE — GLOVE BIOGEL INDICATOR SZ 8 SURGICAL PF LTX - (50/BX 4BX/CA)

## (undated) DEVICE — CANISTER SUCTION 3000ML MECHANICAL FILTER AUTO SHUTOFF MEDI-VAC NONSTERILE LF DISP  (40EA/CA)

## (undated) DEVICE — DRESSING TRANSPARENT FILM TEGADERM 4 X 4.75" (50EA/BX)"